# Patient Record
Sex: MALE | Race: WHITE | NOT HISPANIC OR LATINO | Employment: OTHER | URBAN - METROPOLITAN AREA
[De-identification: names, ages, dates, MRNs, and addresses within clinical notes are randomized per-mention and may not be internally consistent; named-entity substitution may affect disease eponyms.]

---

## 2017-08-07 ENCOUNTER — HOSPITAL ENCOUNTER (EMERGENCY)
Facility: HOSPITAL | Age: 82
Discharge: HOME | End: 2017-08-07
Attending: EMERGENCY MEDICINE | Admitting: EMERGENCY MEDICINE
Payer: MEDICARE

## 2017-08-07 ENCOUNTER — APPOINTMENT (EMERGENCY)
Dept: RADIOLOGY | Facility: HOSPITAL | Age: 82
End: 2017-08-07
Payer: MEDICARE

## 2017-08-07 VITALS
SYSTOLIC BLOOD PRESSURE: 147 MMHG | OXYGEN SATURATION: 98 % | RESPIRATION RATE: 17 BRPM | TEMPERATURE: 97.5 F | HEART RATE: 64 BPM | WEIGHT: 243.17 LBS | DIASTOLIC BLOOD PRESSURE: 78 MMHG

## 2017-08-07 DIAGNOSIS — M17.11 PRIMARY OSTEOARTHRITIS OF RIGHT KNEE: Primary | ICD-10-CM

## 2017-08-07 PROCEDURE — 73564 X-RAY EXAM KNEE 4 OR MORE: CPT

## 2017-08-07 PROCEDURE — 99283 EMERGENCY DEPT VISIT LOW MDM: CPT

## 2017-08-07 RX ORDER — PREDNISONE 20 MG/1
60 TABLET ORAL DAILY
Status: DISCONTINUED | OUTPATIENT
Start: 2017-08-07 | End: 2017-08-07 | Stop reason: HOSPADM

## 2017-08-07 RX ORDER — METHOCARBAMOL 500 MG/1
500 TABLET, FILM COATED ORAL ONCE
Status: COMPLETED | OUTPATIENT
Start: 2017-08-07 | End: 2017-08-07

## 2017-08-07 RX ORDER — PREDNISONE 20 MG/1
60 TABLET ORAL DAILY
Qty: 15 TABLET | Refills: 0 | Status: SHIPPED | OUTPATIENT
Start: 2017-08-07 | End: 2017-08-12

## 2017-08-07 RX ORDER — METHOCARBAMOL 500 MG/1
500 TABLET, FILM COATED ORAL 2 TIMES DAILY
Qty: 20 TABLET | Refills: 0 | Status: SHIPPED | OUTPATIENT
Start: 2017-08-07 | End: 2018-10-08

## 2017-08-07 RX ADMIN — PREDNISONE 60 MG: 20 TABLET ORAL at 12:34

## 2017-08-07 RX ADMIN — METHOCARBAMOL 500 MG: 500 TABLET ORAL at 12:34

## 2017-08-22 ENCOUNTER — ALLSCRIPTS OFFICE VISIT (OUTPATIENT)
Dept: OTHER | Facility: OTHER | Age: 82
End: 2017-08-22

## 2017-09-18 ENCOUNTER — GENERIC CONVERSION - ENCOUNTER (OUTPATIENT)
Dept: OTHER | Facility: OTHER | Age: 82
End: 2017-09-18

## 2018-01-13 VITALS — SYSTOLIC BLOOD PRESSURE: 115 MMHG | DIASTOLIC BLOOD PRESSURE: 76 MMHG | HEART RATE: 60 BPM

## 2018-10-08 ENCOUNTER — HOSPITAL ENCOUNTER (EMERGENCY)
Facility: HOSPITAL | Age: 83
Discharge: HOME | End: 2018-10-08
Attending: EMERGENCY MEDICINE | Admitting: EMERGENCY MEDICINE
Payer: MEDICARE

## 2018-10-08 VITALS
RESPIRATION RATE: 18 BRPM | HEART RATE: 60 BPM | HEIGHT: 67 IN | TEMPERATURE: 97.9 F | OXYGEN SATURATION: 99 % | DIASTOLIC BLOOD PRESSURE: 64 MMHG | WEIGHT: 230 LBS | SYSTOLIC BLOOD PRESSURE: 113 MMHG | BODY MASS INDEX: 36.1 KG/M2

## 2018-10-08 DIAGNOSIS — M25.561 RIGHT KNEE PAIN: Primary | ICD-10-CM

## 2018-10-08 PROCEDURE — 99283 EMERGENCY DEPT VISIT LOW MDM: CPT

## 2018-10-08 RX ORDER — METHOCARBAMOL 500 MG/1
500 TABLET, FILM COATED ORAL ONCE
Status: COMPLETED | OUTPATIENT
Start: 2018-10-08 | End: 2018-10-08

## 2018-10-08 RX ORDER — PREDNISONE 20 MG/1
40 TABLET ORAL DAILY
Qty: 10 TABLET | Refills: 0 | Status: SHIPPED | OUTPATIENT
Start: 2018-10-08 | End: 2018-10-13

## 2018-10-08 RX ORDER — BUMETANIDE 0.5 MG/1
0.5 TABLET ORAL DAILY
COMMUNITY
End: 2019-05-23 | Stop reason: SDUPTHER

## 2018-10-08 RX ORDER — PREDNISONE 20 MG/1
60 TABLET ORAL ONCE
Status: COMPLETED | OUTPATIENT
Start: 2018-10-08 | End: 2018-10-08

## 2018-10-08 RX ORDER — METHOCARBAMOL 500 MG/1
500 TABLET, FILM COATED ORAL 2 TIMES DAILY PRN
Qty: 14 TABLET | Refills: 0 | Status: SHIPPED | OUTPATIENT
Start: 2018-10-08 | End: 2018-10-11

## 2018-10-08 RX ADMIN — PREDNISONE 60 MG: 20 TABLET ORAL at 15:25

## 2018-10-08 RX ADMIN — METHOCARBAMOL 500 MG: 500 TABLET ORAL at 15:25

## 2018-10-08 NOTE — ED NOTES
Was at urgent care Saturday placed on meloxicam without relief        Juan Carlos Singh, RN  10/08/18 7127

## 2018-10-08 NOTE — DISCHARGE INSTRUCTIONS
Please return if you worsening or other concerning symptoms otherwise follow up as instructed as discussed    Knee Pain   WHAT YOU NEED TO KNOW:   Knee pain may start suddenly, or it may be a long-term problem  You may have pain on the side, front, or back of your knee  You may have knee stiffness and swelling  You may hear popping sounds or feel like your knee is giving way or locking up as you walk  You may feel pain when you sit, stand, walk, or climb up and down stairs  Knee pain can be caused by conditions such as obesity, inflammation, or strains or tears in ligaments or tendons  DISCHARGE INSTRUCTIONS:   Follow up with your healthcare provider within 24 hours or as directed: You may need follow-up treatments, such as steroid injections to decrease pain  Write down your questions so you remember to ask them during your visits  Self-care:   · Rest  your knee so it can heal  Limit activities that increase your pain  · Ice  can help reduce swelling  Wrap ice in a towel and put it on your knee for as long and as often as directed  · Compression  with a brace or bandage can help reduce swelling  Use a brace or bandage only as directed  · Elevation  helps decrease pain and swelling  Elevate your knee while you are sitting or lying down  Prop your leg on pillows to keep your knee above the level of your heart  Medicines:   · NSAIDs  help decrease swelling and pain or fever  This medicine is available with or without a doctor's order  NSAIDs can cause stomach bleeding or kidney problems in certain people  If you take blood thinner medicine, always ask your healthcare provider if NSAIDs are safe for you  Always read the medicine label and follow directions  · Acetaminophen  decreases pain and fever  It is available without a doctor's order  Ask how much to take and when to take it  Follow directions  Acetaminophen can cause liver damage if not taken correctly  · Take your medicine as directed  Contact your healthcare provider if you think your medicine is not helping or if you have side effects  Tell him or her if you are allergic to any medicine  Keep a list of the medicines, vitamins, and herbs you take  Include the amounts, and when and why you take them  Bring the list or the pill bottles to follow-up visits  Carry your medicine list with you in case of an emergency  Exercise as directed: You may need to see a physical therapist or do recommended exercises to improve movement and decrease your pain  You may be directed to walk, swim, or ride a bike  Follow your exercise plan exactly as directed to avoid further injury  Contact your healthcare provider if:   · You have questions or concerns about your condition or care  Return to the emergency department if:   · Your pain is worse, even after treatment  · You cannot bend or straighten your leg completely  · The swelling around your knee does not go down even with treatment  · Your knee is painful and hot to the touch  © 2017 [address] Information is for End User's use only and may not be sold, redistributed or otherwise used for commercial purposes  All illustrations and images included in CareNotes® are the copyrighted property of [employer] , [employer]  or [employer]  The above information is an  only  It is not intended as medical advice for individual conditions or treatments  Talk to your doctor, nurse or pharmacist before following any medical regimen to see if it is safe and effective for you

## 2018-10-08 NOTE — ED PROVIDER NOTES
History  Chief Complaint   Patient presents with    Knee Pain     pt c/o right knee pain, denies injuries  70-year-old male with a history of hypertension hypothyroid osteoarthritis presenting for evaluation of right knee pain he is here with his son he reports the last 2 days his right knee is been a little bit more stiff and sore than usual although he is able to ambulate he believes that there is mild swelling presents today for further evaluation he was evaluated in August with the exact same symptoms he had an x-ray that demonstrated severe tricompartmental arthritis he was given prednisone Robaxin and diclofenac gel which resolved the symptoms he followed up with Orthopedics he had been doing well until 2 days ago again no traumatic injury, or inciting incident pain is localized to his right anterior knee it is worse when walking although he is able to ambulate is better with rest he has not taking thing for this he note possible mild swelling but otherwise denies fevers chills constitutional symptoms change in activity denies warmth redness weakness unilateral leg swelling change in color changes skin temperature other joint involvement he has no other complaints or concerns otherwise denies a complete review systems as noted            Prior to Admission Medications   Prescriptions Last Dose Informant Patient Reported? Taking?    FOLIC ACID PO 36/2/6589 at Unknown time  Yes Yes   Sig: Take 1 capsule by mouth daily   bumetanide (BUMEX) 0 5 MG tablet   Yes Yes   Sig: Take 0 5 mg by mouth daily   carvedilol (COREG) 12 5 mg tablet 10/8/2018 at Unknown time  Yes Yes   Sig: Take 12 5 mg by mouth 3 (three) times a day     donepezil (ARICEPT) 10 mg tablet 10/8/2018 at Unknown time  Yes Yes   Sig: Take 10 mg by mouth daily at bedtime   levothyroxine 25 mcg tablet 10/8/2018 at Unknown time  Yes Yes   Sig: Take 25 mcg by mouth daily in the early morning   simvastatin (ZOCOR) 40 mg tablet 10/8/2018 at Unknown time  Yes Yes   Sig: Take 40 mg by mouth daily with dinner   spironolactone (ALDACTONE) 25 mg tablet 10/8/2018 at Unknown time  Yes Yes   Sig: Take 25 mg by mouth daily   warfarin (COUMADIN) 4 mg tablet 10/7/2018 at Unknown time  Yes Yes   Sig: Take 6 mg by mouth daily at bedtime      Facility-Administered Medications: None       Past Medical History:   Diagnosis Date    Arthritis     Cardiac disorder     Disease of thyroid gland     Hyperlipidemia     Hypertension     [facility]     [facility] sacrum [facility]     Stroke ([place] [address] )     Thyroid disorder        Past Surgical History:   Procedure Laterality Date    CARDIAC PACEMAKER PLACEMENT      COLOSTOMY      HIP SURGERY      LEG SURGERY      incision below knee       Family History   Problem Relation Age of Onset    Diabetes Mother     Hypertension Mother      I have reviewed and agree with the history as documented  Social History   Substance Use Topics    Smoking status: Never Smoker    Smokeless tobacco: Never Used      Comment: former smoker per [employer]     Alcohol use No        Review of Systems   Constitutional: Negative for activity change, appetite change, fatigue and fever  Gastrointestinal: Negative for nausea and vomiting  Endocrine: Negative for polydipsia and polyphagia  Musculoskeletal: Positive for joint swelling  Negative for arthralgias, gait problem and myalgias  Right knee pain   Skin: Negative for color change, pallor, rash and wound  Neurological: Negative for dizziness, weakness and numbness  Hematological: Negative for adenopathy  Does not bruise/bleed easily  Psychiatric/Behavioral: Negative for agitation and behavioral problems  All other systems reviewed and are negative  Physical Exam  Physical Exam   Constitutional: He is oriented to person, place, and time  He appears well-developed and well-nourished  No distress     Very well for stated age in no acute distress   HENT: Head: Normocephalic and atraumatic  Eyes: Pupils are equal, round, and reactive to light  EOM are normal    Neck: Normal range of motion  Neck supple  No tracheal deviation present  Cardiovascular: Normal rate, regular rhythm and normal heart sounds  Exam reveals no gallop and no friction rub  No murmur heard  Pulmonary/Chest: Effort normal and breath sounds normal  He has no wheezes  He has no rales  Abdominal: Soft  Bowel sounds are normal  He exhibits no distension  There is no tenderness  There is no rebound and no guarding  Musculoskeletal:   Minimal swelling of the right anterior knee when compared to the left however patient has full active and passive range of motion minimal discomfort, there is no warmth no erythema no injury to the skin he has a stable knee exam quadriceps is intact no unilateral leg swelling he has 2+ symmetric DP PT pulses again no other acute findings on musculoskeletal exam   Neurological: He is alert and oriented to person, place, and time  No cranial nerve deficit  He exhibits normal muscle tone  Coordination normal    Skin: Skin is warm and dry  No rash noted  Psychiatric: He has a normal mood and affect  His behavior is normal    Nursing note and vitals reviewed        Vital Signs  ED Triage Vitals [10/08/18 1414]   Temperature Pulse Respirations Blood Pressure SpO2   97 9 °F (36 6 °C) 60 18 113/64 99 %      Temp Source Heart Rate Source Patient Position - Orthostatic VS BP Location FiO2 (%)   Oral Monitor Sitting Left arm --      Pain Score       9           Vitals:    10/08/18 1414   BP: 113/64   Pulse: 60   Patient Position - Orthostatic VS: Sitting       Visual Acuity      ED Medications  Medications   predniSONE tablet 60 mg (60 mg Oral Given 10/8/18 1525)   methocarbamol (ROBAXIN) tablet 500 mg (500 mg Oral Given 10/8/18 1525)       Diagnostic Studies  Results Reviewed     None                 No orders to display              Procedures  Procedures Phone Contacts  ED Phone Contact    ED Course           Identification of Seniors at Risk      Most Recent Value   (ISAR) Identification of Seniors at Risk   Before the illness or injury that brought you to the Emergency, did you need someone to help you on a regular basis? 0 Filed at: 10/08/2018 1415   In the last 24 hours, have you needed more help than usual?  0 Filed at: 10/08/2018 1415   Have you been hospitalized for one or more nights during the past 6 months? 0 Filed at: 10/08/2018 1415   In general, do you see well?  0 Filed at: 10/08/2018 1415   In general, do you have serious problems with your memory? 0 Filed at: 10/08/2018 1415   Do you take more than three different medications every day?   1 Filed at: 10/08/2018 1415   ISAR Score  1 Filed at: 10/08/2018 1415                          [facility]  Number of Diagnoses or Management Options  Right knee pain:   Diagnosis management comments: 25-year-old male with a history of severe tricompartmental osteoarthritis his right knee with 2 days of atraumatic right knee discomfort localized to his right knee only possibly mild swelling no fevers chills warmth or other associated symptoms history of similar, on exam he is afebrile normal vital signs is clinically very well appearing he is here with his son he has full active and passive range of motion with minimal discomfort minimal effusion there is no warmth erythema or other acute ischemic infectious inflammatory traumatic findings on exam no indication for imaging at this time her discussion with patient family, previously successful with prednisone Robaxin diclofenac gel, will give Ace wraps ice pack close return follow-up instructions patient family agreeable plan     CritCare Time    Disposition  Final diagnoses:   Right knee pain     Time reflects when diagnosis was documented in both MDM as applicable and the Disposition within this note     Time User Action Codes Description Comment    10/8/2018  3:22 PM Chris Lozano Add [U49 [id number]] Right knee pain       ED Disposition     ED Disposition Condition Comment    Discharge  Rolan Hernandez discharge to home/self care      Condition at discharge: Good        Follow-up Information     Follow up With Specialties Details Why Contact Info Additional Information    [address] [facility] Emergency Medicine  If symptoms worsen [address]  [phone] [place] [facility], [address] [facility], [place], [place], [address] [facility] In 1 week As needed [address] [facility]  [address] [id number]  [address] [facility], [address] [address] [place], [place], [phone]          Discharge Medication List as of 10/8/2018  3:24 PM      START taking these medications    Details   diclofenac sodium (VOLTAREN) 1 % Apply 2 g topically 4 (four) times a day for 7 days, Starting Mon 10/8/2018, Until Mon 10/15/2018, Print      methocarbamol (ROBAXIN) 500 mg tablet Take 1 tablet (500 mg total) by mouth 2 (two) times a day as needed for muscle spasms for up to 7 days, Starting Mon 10/8/2018, Until Mon 10/15/2018, Print      predniSONE 20 mg tablet Take 2 tablets (40 mg total) by mouth daily for 5 days, Starting Mon 10/8/2018, Until Sat 10/13/2018, Print         CONTINUE these medications which have NOT CHANGED    Details   bumetanide (BUMEX) 0 5 MG tablet Take 0 5 mg by mouth daily, Historical Med      carvedilol (COREG) 12 5 mg tablet Take 12 5 mg by mouth 3 (three) times a day  , Starting Tue 4/26/2016, Historical Med      donepezil (ARICEPT) 10 mg tablet Take 10 mg by mouth daily at bedtime, Starting 1/16/2015, Until Discontinued, Historical Med      FOLIC ACID PO Take 1 capsule by mouth daily, Until Discontinued, Historical Med      levothyroxine 25 mcg tablet Take 25 mcg by mouth daily in the early morning, Starting 2/20/2015, Until Discontinued, Historical Med      simvastatin (ZOCOR) 40 mg tablet Take 40 mg by mouth daily with dinner, Starting 9/29/2016, Until Discontinued, Historical Med      spironolactone (ALDACTONE) 25 mg tablet Take 25 mg by mouth daily, Starting 9/29/2016, Until Discontinued, Historical Med      warfarin (COUMADIN) 4 mg tablet Take 6 mg by mouth daily at bedtime, Starting 9/29/2016, Until Discontinued, Historical Med           No discharge procedures on file      ED Provider  Electronically Signed by           Jessica Martinez DO  10/10/18 1133

## 2018-10-11 ENCOUNTER — HOSPITAL ENCOUNTER (EMERGENCY)
Facility: HOSPITAL | Age: 83
Discharge: HOME | End: 2018-10-11
Attending: EMERGENCY MEDICINE | Admitting: EMERGENCY MEDICINE
Payer: MEDICARE

## 2018-10-11 VITALS
HEART RATE: 61 BPM | DIASTOLIC BLOOD PRESSURE: 62 MMHG | SYSTOLIC BLOOD PRESSURE: 125 MMHG | OXYGEN SATURATION: 95 % | RESPIRATION RATE: 16 BRPM | TEMPERATURE: 97.6 F

## 2018-10-11 DIAGNOSIS — F19.951 STEROID-INDUCED PSYCHOSIS, WITH HALLUCINATIONS (HCC): Primary | ICD-10-CM

## 2018-10-11 PROCEDURE — 99284 EMERGENCY DEPT VISIT MOD MDM: CPT

## 2018-10-11 NOTE — DISCHARGE INSTRUCTIONS
You have steroid induced hallucinations  Please discontinue steroids  Please return in 2 days if no improvement of loose in a shins  If any worsening or new symptoms please return to the emergency department  Prednisone (By mouth)   Prednisone (PRED-ni-sone)  Treats many diseases and conditions, especially problems related to inflammation  This medicine is a corticosteroid  Brand Name(s): Contrast Allergy PreMed Pack, Leatha, predniSONE Intensol   There may be other brand names for this medicine  When This Medicine Should Not Be Used: This medicine is not right for everyone  Do not use if you had an allergic reaction to prednisone or if you are pregnant  How to Use This Medicine:   Liquid, Tablet, Delayed Release Tablet  · Take your medicine as directed  Your dose may need to be changed several times to find what works best for you  · It is best to take this medicine with food or milk  · Swallow the delayed-release tablet whole  Do not crush, break, or chew it  · Measure the oral liquid medicine with a marked measuring spoon, oral syringe, or medicine cup  · Missed dose: Take a dose as soon as you remember  If it is almost time for your next dose, wait until then and take a regular dose  Do not take extra medicine to make up for a missed dose  · Store the medicine in a closed container at room temperature, away from heat, moisture, and direct light  Do not freeze the oral liquid  Drugs and Foods to Avoid:   Ask your doctor or pharmacist before using any other medicine, including over-the-counter medicines, vitamins, and herbal products    · Tell your doctor if you use any of the following:  ¨ Aminoglutethimide, amphotericin B, carbamazepine, cholestyramine, cyclosporine, digoxin, isoniazid, ketoconazole, phenobarbital, phenytoin, or rifampin  ¨ Blood thinner, such as warfarin  ¨ NSAID pain or arthritis medicine, such as aspirin, diclofenac, ibuprofen, naproxen, celecoxib  ¨ Diuretic (water pill)  ¨ Diabetes medicine  ¨ Macrolide antibiotic, such as azithromycin, clarithromycin, erythromycin  ¨ Estrogen, including birth control pills or hormone replacement therapy  · This medicine may interfere with vaccines  Ask your doctor before you get a flu shot or any other vaccines  Warnings While Using This Medicine:   · It is not safe to take this medicine during pregnancy  It could harm an unborn baby  Tell your doctor right away if you become pregnant  · Tell your doctor if you are breastfeeding or if you have kidney problems, heart failure, high blood pressure, a recent heart attack, diabetes, glaucoma, osteoporosis, or thyroid problems  Tell your doctor about any infection you have  Also tell your doctor if you have had mental or emotional problems (such as depression) or stomach or bowel problems (such as an ulcer or diverticulitis)  · This medicine may cause the following problems:  ¨ Mood or behavior changes  ¨ Higher blood pressure, retaining water, changes in salt or potassium levels in your body  ¨ Cataracts or glaucoma (with long-term use)  ¨ Weak bones or osteoporosis (with long-term use)  ¨ Slow growth in children (with long-term use)  ¨ Muscle problems (with high doses, especially if you have myasthenia gravis or similar nerve and muscle problems)  · Do not stop using this medicine suddenly  Your doctor will need to slowly decrease your dose before you stop it completely  · This medicine could cause you to get infections more easily  Tell your doctor right away if you are exposed to chicken pox, measles, or other serious infection  Tell your doctor if you had a serious infection in the past, such as tuberculosis or herpes  · Tell your doctor about any extra stress or anxiety in your life  Your dose might need to be changed for a short time  · Tell any doctor or dentist who treats you that you are using this medicine  This medicine may affect certain medical test results    · Keep all medicine out of the reach of children  Never share your medicine with anyone  Possible Side Effects While Using This Medicine:   Call your doctor right away if you notice any of these side effects:  · Allergic reaction: Itching or hives, swelling in your face or hands, swelling or tingling in your mouth or throat, chest tightness, trouble breathing  · Dark freckles, skin color changes, coldness, weakness, tiredness, nausea, vomiting, weight loss  · Depression, unusual thoughts, feelings, or behaviors, trouble sleeping  · Fever, chills, cough, sore throat, and body aches  · Muscle pain or weakness  · Rapid weight gain, swelling in your hands, ankles, or feet  · Severe stomach pain, nausea, vomiting, or red or black stools  · Skin changes or growths  · Trouble seeing, eye pain, headache  If you notice these less serious side effects, talk with your doctor:   · Increased appetite  · Round, puffy face  · Weight gain around your neck, upper back, breast, face, or waist  If you notice other side effects that you think are caused by this medicine, tell your doctor  Call your doctor for medical advice about side effects  You may report side effects to FDA at 5-283-FDA-0113  © 2017 [facility] Information is for End User's use only and may not be sold, redistributed or otherwise used for commercial purposes  The above information is an  only  It is not intended as medical advice for individual conditions or treatments  Talk to your doctor, nurse or pharmacist before following any medical regimen to see if it is safe and effective for you

## 2018-10-12 NOTE — ED PROVIDER NOTES
History  Chief Complaint   Patient presents with    Hallucinations     pt recently started on prednisone and meloxicam started with hallucinations since yesterday     HPI  This is a 41-year-old male that presents today with hallucinations  Son at bedside states patient was recently here for ankle pain which she received meloxicam along with prednisone  Since starting those medications patient has been hallucinating where he believes there is and was outside along with there is pictures on the wall  He has also had been having frightening nightmares  This is day 2 and taking steroids  Patient has never taken steroids in the past   No other medication changes  No fevers or chills  Patient alert and oriented and has insight  Denies any suicidal homicidal ideations  Patient does understand he is having a loose in a shins  No chest pain headache blurry vision neck pain chest pain shortness of breath abdominal pain weakness numbness or tingling  No urinary complaints  Patient appears to be well appearing  41-year-old male that presents today with hallucinations  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 Taking?    FOLIC ACID PO   Yes Yes   Sig: Take 1 capsule by mouth daily   bumetanide (BUMEX) 0 5 MG tablet   Yes Yes   Sig: Take 0 5 mg by mouth daily   carvedilol (COREG) 12 5 mg tablet   Yes Yes   Sig: Take 12 5 mg by mouth 3 (three) times a day     diclofenac sodium (VOLTAREN) 1 %   No Yes   Sig: Apply 2 g topically 4 (four) times a day for 7 days   donepezil (ARICEPT) 10 mg tablet   Yes Yes   Sig: Take 10 mg by mouth daily at bedtime   levothyroxine 25 mcg tablet   Yes Yes   Sig: Take 25 mcg by mouth daily in the early morning   predniSONE 20 mg tablet   No Yes   Sig: Take 2 tablets (40 mg total) by mouth daily for 5 days   simvastatin (ZOCOR) 40 mg tablet   Yes Yes   Sig: Take 40 mg by mouth daily with dinner   spironolactone (ALDACTONE) 25 mg tablet   Yes Yes   Sig: Take 25 mg by mouth daily   warfarin (COUMADIN) 4 mg tablet   Yes Yes   Sig: Take 6 mg by mouth daily at bedtime      Facility-Administered Medications: None       Past Medical History:   Diagnosis Date    Arthritis     Cardiac disorder     Disease of thyroid gland     Hyperlipidemia     Hypertension     Osteomyelitis of leg (HCC)     Osteomyelitis of sacrum (HCC)     Stroke ([facility] )     Thyroid disorder        Past Surgical History:   Procedure Laterality Date    CARDIAC PACEMAKER PLACEMENT      COLOSTOMY      HIP SURGERY      LEG SURGERY      incision below knee       Family History   Problem Relation Age of Onset    Diabetes Mother     Hypertension Mother      I have reviewed and agree with the history as documented  Social History   Substance Use Topics    Smoking status: Never Smoker    Smokeless tobacco: Never Used      Comment: former smoker per allscript     Alcohol use No        Review of Systems   Constitutional: Negative  Negative for diaphoresis and fever  HENT: Negative  Respiratory: Negative  Negative for cough, shortness of breath and wheezing  Cardiovascular: Negative  Negative for chest pain, palpitations and leg swelling  Gastrointestinal: Negative for abdominal distention, abdominal pain, nausea and vomiting  Genitourinary: Negative  Musculoskeletal: Negative  Skin: Negative  Neurological: Negative  Psychiatric/Behavioral: Positive for hallucinations  All other systems reviewed and are negative  Physical Exam  Physical Exam   Constitutional: He is oriented to person, place, and time  He appears well-developed and well-nourished  No distress  HENT:   Head: Normocephalic and atraumatic  Nose: Nose normal    Mouth/Throat: Oropharynx is clear and moist    Eyes: Pupils are equal, round, and reactive to light  Conjunctivae and EOM are normal    Neck: Normal range of motion  Neck supple  Cardiovascular: Normal rate, regular rhythm and normal heart sounds  No murmur heard  Pulmonary/Chest: Effort normal and breath sounds normal  No respiratory distress  He has no wheezes  He has no rales  Abdominal: Soft  Bowel sounds are normal  He exhibits no distension  There is no tenderness  There is no rebound and no guarding  Musculoskeletal: Normal range of motion  He exhibits no edema, tenderness or deformity  Neurological: He is alert and oriented to person, place, and time  No cranial nerve deficit  Normal neuro exam   Skin: Skin is warm and dry  No rash noted  He is not diaphoretic  No pallor  Psychiatric: He has a normal mood and affect  Vitals reviewed  Vital Signs  ED Triage Vitals [10/11/18 1420]   Temperature Pulse Respirations Blood Pressure SpO2   97 6 °F (36 4 °C) 61 16 125/62 95 %      Temp Source Heart Rate Source Patient Position - Orthostatic VS BP Location FiO2 (%)   Oral Monitor Sitting Left arm --      Pain Score       No Pain           Vitals:    10/11/18 1420   BP: 125/62   Pulse: 61   Patient Position - Orthostatic VS: Sitting       Visual Acuity      ED Medications  Medications - No data to display    Diagnostic Studies  Results Reviewed     None                 No orders to display              Procedures  Procedures       Phone Contacts  ED Phone Contact    ED Course           Identification of Seniors at Risk      Most Recent Value   (ISAR) Identification of Seniors at Risk   Before the illness or injury that brought you to the Emergency, did you need someone to help you on a regular basis?   1 Filed at: 10/11/2018 1422   In the last 24 hours, have you needed more help than usual?  1 Filed at: 10/11/2018 1422   Have you been hospitalized for one or more nights during the past 6 months? 0 Filed at: 10/11/2018 1422   In general, do you see well?  0 Filed at: 10/11/2018 1422   In general, do you have serious problems with your memory? 0 Filed at: 10/11/2018 1422   Do you take more than three different medications every day? 1 Filed at: 10/11/2018 1422   ISAR Score  3 Filed at: 10/11/2018 1422                          [facility]  CritCare Time    Disposition  Final diagnoses:   Steroid-induced psychosis, with hallucinations ([address] )     Time reflects when diagnosis was documented in both MDM as applicable and the Disposition within this note     Time User Action Codes Description Comment    10/11/2018  3:03 PM Iván Mittal U  8  [E80 921] Steroid-induced psychosis, with hallucinations [facility])       ED Disposition     ED Disposition Condition Comment    Discharge  [place] discharge to home/self care      Condition at discharge: Good        Follow-up Information     Follow up With Specialties Details Why Contact Info    Krysta Strange MD Family Medicine Schedule an appointment as soon as possible for a visit  [address]  [phone]            Discharge Medication List as of 10/11/2018  3:04 PM      CONTINUE these medications which have NOT CHANGED    Details   bumetanide (BUMEX) 0 5 MG tablet Take 0 5 mg by mouth daily, Historical Med      carvedilol (COREG) 12 5 mg tablet Take 12 5 mg by mouth 3 (three) times a day  , Starting Tue 4/26/2016, Historical Med      diclofenac sodium (VOLTAREN) 1 % Apply 2 g topically 4 (four) times a day for 7 days, Starting Mon 10/8/2018, Until Mon 10/15/2018, Print      donepezil (ARICEPT) 10 mg tablet Take 10 mg by mouth daily at bedtime, Starting 1/16/2015, Until Discontinued, Historical Med      FOLIC ACID PO Take 1 capsule by mouth daily, Until Discontinued, Historical Med      levothyroxine 25 mcg tablet Take 25 mcg by mouth daily in the early morning, Starting 2/20/2015, Until Discontinued, Historical Med predniSONE 20 mg tablet Take 2 tablets (40 mg total) by mouth daily for 5 days, Starting Mon 10/8/2018, Until Sat 10/13/2018, Print      simvastatin (ZOCOR) 40 mg tablet Take 40 mg by mouth daily with dinner, Starting 9/29/2016, Until Discontinued, Historical Med      spironolactone (ALDACTONE) 25 mg tablet Take 25 mg by mouth daily, Starting 9/29/2016, Until Discontinued, Historical Med      warfarin (COUMADIN) 4 mg tablet Take 6 mg by mouth daily at bedtime, Starting 9/29/2016, Until Discontinued, Historical Med      methocarbamol (ROBAXIN) 500 mg tablet Take 1 tablet (500 mg total) by mouth 2 (two) times a day as needed for muscle spasms for up to 7 days, Starting Mon 10/8/2018, Until Mon 10/15/2018, Print           No discharge procedures on file      ED Provider  Electronically Signed by           Rudy Kinsey MD  10/11/18 5963

## 2019-03-26 ENCOUNTER — APPOINTMENT (OUTPATIENT)
Dept: ULTRASOUND IMAGING | Facility: HOSPITAL | Age: 84
End: 2019-03-26
Payer: MEDICARE

## 2019-03-26 ENCOUNTER — HOSPITAL ENCOUNTER (OUTPATIENT)
Facility: HOSPITAL | Age: 84
Setting detail: OBSERVATION
Discharge: HOME HEALTH | End: 2019-03-27
Attending: EMERGENCY MEDICINE | Admitting: INTERNAL MEDICINE
Payer: MEDICARE

## 2019-03-26 ENCOUNTER — APPOINTMENT (EMERGENCY)
Dept: RADIOLOGY | Facility: HOSPITAL | Age: 84
End: 2019-03-26
Payer: MEDICARE

## 2019-03-26 ENCOUNTER — APPOINTMENT (EMERGENCY)
Dept: CT IMAGING | Facility: HOSPITAL | Age: 84
End: 2019-03-26
Payer: MEDICARE

## 2019-03-26 DIAGNOSIS — R41.0 CONFUSION: ICD-10-CM

## 2019-03-26 DIAGNOSIS — R47.81 SLURRED SPEECH: Primary | ICD-10-CM

## 2019-03-26 DIAGNOSIS — G93.40 ACUTE ENCEPHALOPATHY: ICD-10-CM

## 2019-03-26 DIAGNOSIS — Z86.73 HISTORY OF CVA (CEREBROVASCULAR ACCIDENT): ICD-10-CM

## 2019-03-26 PROBLEM — Z92.29 HISTORY OF COUMADIN THERAPY: Chronic | Status: ACTIVE | Noted: 2019-03-26
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
## 2019-03-26 PROBLEM — I10 ESSENTIAL HYPERTENSION: Chronic | Status: ACTIVE | Noted: 2019-03-26

## 2019-03-26 PROBLEM — E03.9 ACQUIRED HYPOTHYROIDISM: Chronic | Status: ACTIVE | Noted: 2019-03-26

## 2019-03-26 PROBLEM — R79.89 ELEVATED SERUM CREATININE: Status: ACTIVE | Noted: 2019-03-26

## 2019-03-26 LAB
ALBUMIN SERPL BCP-MCNC: 2.9 G/DL (ref 3.5–5)
ALP SERPL-CCNC: 76 U/L (ref 46–116)
ALT SERPL W P-5'-P-CCNC: 18 U/L (ref 12–78)
ANION GAP SERPL CALCULATED.3IONS-SCNC: 5 MMOL/L (ref 4–13)
APTT PPP: 50 SECONDS (ref 26–38)
AST SERPL W P-5'-P-CCNC: 14 U/L (ref 5–45)
BASOPHILS # BLD AUTO: 0.03 THOUSANDS/ΜL (ref 0–0.1)
BASOPHILS NFR BLD AUTO: 0 % (ref 0–1)
BILIRUB SERPL-MCNC: 0.5 MG/DL (ref 0.2–1)
BILIRUB UR QL STRIP: NEGATIVE
BUN SERPL-MCNC: 24 MG/DL (ref 5–25)
CALCIUM SERPL-MCNC: 8.6 MG/DL (ref 8.3–10.1)
CHLORIDE SERPL-SCNC: 106 MMOL/L (ref 100–108)
CLARITY UR: CLEAR
CO2 SERPL-SCNC: 30 MMOL/L (ref 21–32)
COLOR UR: YELLOW
CREAT SERPL-MCNC: 1.33 MG/DL (ref 0.6–1.3)
EOSINOPHIL # BLD AUTO: 0.12 THOUSAND/ΜL (ref 0–0.61)
EOSINOPHIL NFR BLD AUTO: 2 % (ref 0–6)
ERYTHROCYTE [DISTWIDTH] IN BLOOD BY AUTOMATED COUNT: 13.2 % (ref 11.6–15.1)
GFR SERPL CREATININE-BSD FRML MDRD: 47 ML/MIN/1.73SQ M
GLUCOSE SERPL-MCNC: 96 MG/DL (ref 65–140)
GLUCOSE UR STRIP-MCNC: NEGATIVE MG/DL
HCT VFR BLD AUTO: 37 % (ref 36.5–49.3)
HGB BLD-MCNC: 11.9 G/DL (ref 12–17)
HGB UR QL STRIP.AUTO: NEGATIVE
IMM GRANULOCYTES # BLD AUTO: 0.03 THOUSAND/UL (ref 0–0.2)
IMM GRANULOCYTES NFR BLD AUTO: 0 % (ref 0–2)
INR PPP: 3.29 (ref 0.86–1.17)
KETONES UR STRIP-MCNC: ABNORMAL MG/DL
LEUKOCYTE ESTERASE UR QL STRIP: NEGATIVE
LYMPHOCYTES # BLD AUTO: 1.34 THOUSANDS/ΜL (ref 0.6–4.47)
LYMPHOCYTES NFR BLD AUTO: 17 % (ref 14–44)
MCH RBC QN AUTO: 30.7 PG (ref 26.8–34.3)
MCHC RBC AUTO-ENTMCNC: 32.2 G/DL (ref 31.4–37.4)
MCV RBC AUTO: 96 FL (ref 82–98)
MONOCYTES # BLD AUTO: 0.64 THOUSAND/ΜL (ref 0.17–1.22)
MONOCYTES NFR BLD AUTO: 8 % (ref 4–12)
NEUTROPHILS # BLD AUTO: 5.97 THOUSANDS/ΜL (ref 1.85–7.62)
NEUTS SEG NFR BLD AUTO: 73 % (ref 43–75)
NITRITE UR QL STRIP: NEGATIVE
NRBC BLD AUTO-RTO: 0 /100 WBCS
PH UR STRIP.AUTO: 5.5 [PH]
PLATELET # BLD AUTO: 244 THOUSANDS/UL (ref 149–390)
PMV BLD AUTO: 10.1 FL (ref 8.9–12.7)
POTASSIUM SERPL-SCNC: 4.8 MMOL/L (ref 3.5–5.3)
PROT SERPL-MCNC: 6.4 G/DL (ref 6.4–8.2)
PROT UR STRIP-MCNC: NEGATIVE MG/DL
PROTHROMBIN TIME: 33 SECONDS (ref 11.8–14.2)
RBC # BLD AUTO: 3.87 MILLION/UL (ref 3.88–5.62)
SODIUM SERPL-SCNC: 141 MMOL/L (ref 136–145)
SP GR UR STRIP.AUTO: >=1.03 (ref 1–1.03)
TROPONIN I SERPL-MCNC: <0.02 NG/ML
TSH SERPL DL<=0.05 MIU/L-ACNC: 0.31 UIU/ML (ref 0.36–3.74)
UROBILINOGEN UR QL STRIP.AUTO: 0.2 E.U./DL
WBC # BLD AUTO: 8.13 THOUSAND/UL (ref 4.31–10.16)

## 2019-03-26 PROCEDURE — 99285 EMERGENCY DEPT VISIT HI MDM: CPT

## 2019-03-26 PROCEDURE — 80053 COMPREHEN METABOLIC PANEL: CPT | Performed by: EMERGENCY MEDICINE

## 2019-03-26 PROCEDURE — 70450 CT HEAD/BRAIN W/O DYE: CPT

## 2019-03-26 PROCEDURE — 93005 ELECTROCARDIOGRAM TRACING: CPT

## 2019-03-26 PROCEDURE — 36415 COLL VENOUS BLD VENIPUNCTURE: CPT | Performed by: EMERGENCY MEDICINE

## 2019-03-26 PROCEDURE — 71046 X-RAY EXAM CHEST 2 VIEWS: CPT

## 2019-03-26 PROCEDURE — 93880 EXTRACRANIAL BILAT STUDY: CPT | Performed by: SURGERY

## 2019-03-26 PROCEDURE — 1123F ACP DISCUSS/DSCN MKR DOCD: CPT | Performed by: NURSE PRACTITIONER

## 2019-03-26 PROCEDURE — 93880 EXTRACRANIAL BILAT STUDY: CPT

## 2019-03-26 PROCEDURE — 85025 COMPLETE CBC W/AUTO DIFF WBC: CPT | Performed by: EMERGENCY MEDICINE

## 2019-03-26 PROCEDURE — 99220 PR INITIAL OBSERVATION CARE/DAY 70 MINUTES: CPT | Performed by: INTERNAL MEDICINE

## 2019-03-26 PROCEDURE — 84484 ASSAY OF TROPONIN QUANT: CPT | Performed by: EMERGENCY MEDICINE

## 2019-03-26 PROCEDURE — 85610 PROTHROMBIN TIME: CPT | Performed by: EMERGENCY MEDICINE

## 2019-03-26 PROCEDURE — 76770 US EXAM ABDO BACK WALL COMP: CPT

## 2019-03-26 PROCEDURE — 81003 URINALYSIS AUTO W/O SCOPE: CPT | Performed by: EMERGENCY MEDICINE

## 2019-03-26 PROCEDURE — 85730 THROMBOPLASTIN TIME PARTIAL: CPT | Performed by: EMERGENCY MEDICINE

## 2019-03-26 PROCEDURE — 84443 ASSAY THYROID STIM HORMONE: CPT | Performed by: INTERNAL MEDICINE

## 2019-03-26 RX ORDER — ASPIRIN 81 MG/1
81 TABLET, CHEWABLE ORAL DAILY
Status: DISCONTINUED | OUTPATIENT
Start: 2019-03-26 | End: 2019-03-27

## 2019-03-26 RX ORDER — DONEPEZIL HYDROCHLORIDE 5 MG/1
10 TABLET, FILM COATED ORAL
Status: DISCONTINUED | OUTPATIENT
Start: 2019-03-26 | End: 2019-03-27 | Stop reason: HOSPADM

## 2019-03-26 RX ORDER — WARFARIN SODIUM 4 MG/1
4 TABLET ORAL
Status: DISCONTINUED | OUTPATIENT
Start: 2019-03-27 | End: 2019-03-27 | Stop reason: HOSPADM

## 2019-03-26 RX ORDER — SODIUM CHLORIDE 9 MG/ML
75 INJECTION, SOLUTION INTRAVENOUS ONCE
Status: COMPLETED | OUTPATIENT
Start: 2019-03-26 | End: 2019-03-26

## 2019-03-26 RX ORDER — CARVEDILOL 12.5 MG/1
12.5 TABLET ORAL 2 TIMES DAILY WITH MEALS
Status: DISCONTINUED | OUTPATIENT
Start: 2019-03-26 | End: 2019-03-27 | Stop reason: HOSPADM

## 2019-03-26 RX ORDER — SPIRONOLACTONE 25 MG/1
12.5 TABLET ORAL DAILY
Status: DISCONTINUED | OUTPATIENT
Start: 2019-03-26 | End: 2019-03-26

## 2019-03-26 RX ORDER — LEVOTHYROXINE SODIUM 0.03 MG/1
25 TABLET ORAL
Status: DISCONTINUED | OUTPATIENT
Start: 2019-03-27 | End: 2019-03-27 | Stop reason: HOSPADM

## 2019-03-26 RX ORDER — PRAVASTATIN SODIUM 80 MG/1
80 TABLET ORAL
Status: DISCONTINUED | OUTPATIENT
Start: 2019-03-26 | End: 2019-03-27 | Stop reason: HOSPADM

## 2019-03-26 RX ADMIN — ASPIRIN 81 MG 81 MG: 81 TABLET ORAL at 15:43

## 2019-03-26 RX ADMIN — CARVEDILOL 12.5 MG: 12.5 TABLET, FILM COATED ORAL at 16:44

## 2019-03-26 RX ADMIN — SODIUM CHLORIDE 75 ML/HR: 0.9 INJECTION, SOLUTION INTRAVENOUS at 15:49

## 2019-03-26 RX ADMIN — PRAVASTATIN SODIUM 80 MG: 80 TABLET ORAL at 16:46

## 2019-03-26 RX ADMIN — DONEPEZIL HYDROCHLORIDE 10 MG: 5 TABLET ORAL at 23:38

## 2019-03-26 NOTE — ED PROVIDER NOTES
History  Chief Complaint   Patient presents with    Altered Mental Status     pt fell x2 weeks ago- hit head and back, pt takes coumadin  pt was d/c home from [facility] with no injuries  pt awoke this morning at 1030 with confusion, mumbling words, disorientation  symptoms resolved around noon  came to ED for eval d/t previous fall  pt disoriented in triage  HPI  79 yo M presents with confusion  He is taking eliquis for PAF  Patient fell on 3/13/19, was evaluated at [facility], had negative CT scans  Patient's INR supratherapeutic at 4 8 on 3/20, 3 1 on 3/25  According to family patient woke up this morning at 10:30 a m  With confusion and slurred speech that lasted a few minutes  Has history of stroke years ago  Patient states he still feels slightly off but is now alert and oriented  No fevers or chills  No abdominal pain, chest pain, shortness of breath, dysuria or frequency  No weakness or numbness  Prior to Admission Medications   Prescriptions Last Dose Informant Patient Reported? Taking?    FOLIC ACID PO 8/40/0932 at Unknown time Care Giver Yes Yes   Sig: Take 1 capsule by mouth daily   bumetanide (BUMEX) 0 5 MG tablet 3/25/2019 at Unknown time Care Giver Yes Yes   Sig: Take 0 5 mg by mouth daily   carvedilol (COREG) 12 5 mg tablet 3/26/2019 at Unknown time Care Giver Yes Yes   Sig: Take 12 5 mg by mouth 3 (three) times a day     diclofenac sodium (VOLTAREN) 1 %  Care Giver No No   Sig: Apply 2 g topically 4 (four) times a day for 7 days   donepezil (ARICEPT) 10 mg tablet 3/25/2019 at Unknown time Care Giver Yes Yes   Sig: Take 10 mg by mouth daily at bedtime   levothyroxine 25 mcg tablet 3/26/2019 at Unknown time Care Giver Yes Yes   Sig: Take 25 mcg by mouth daily in the early morning   simvastatin (ZOCOR) 40 mg tablet 3/25/2019 at Unknown time Care Giver Yes Yes   Sig: Take 40 mg by mouth daily at bedtime    spironolactone (ALDACTONE) 25 mg tablet 3/26/2019 at Unknown time Care Giver Yes Yes   Sig: Take 12 5 mg by mouth daily    warfarin (COUMADIN) 4 mg tablet 3/25/2019 at Unknown time Care Giver Yes Yes   Sig: Take 4 mg by mouth daily at bedtime       Facility-Administered Medications: None       Past Medical History:   Diagnosis Date    Arthritis     Cardiac disorder     Disease of thyroid gland     Hyperlipidemia     Hypertension     [facility] )     [facility]omyelitis of sacrum ([facility] )     Stroke ([place] [address] )     Thyroid disorder        Past Surgical History:   Procedure Laterality Date    CARDIAC PACEMAKER PLACEMENT      COLOSTOMY      HIP SURGERY      LEG SURGERY      incision below knee       Family History   Problem Relation Age of Onset    Diabetes Mother     Hypertension Mother      I have reviewed and agree with the history as documented  Social History     Tobacco Use    Smoking status: Never Smoker    Smokeless tobacco: Never Used    Tobacco comment: former smoker per allscript    Substance Use Topics    Alcohol use: No    Drug use: No        Review of Systems   Constitutional: Negative for chills and fever  HENT: Negative for dental problem and ear pain  Eyes: Negative for pain and redness  Respiratory: Negative for cough and shortness of breath  Cardiovascular: Negative for chest pain and palpitations  Gastrointestinal: Negative for abdominal pain and nausea  Endocrine: Negative for polydipsia and polyphagia  Genitourinary: Negative for dysuria and frequency  Musculoskeletal: Negative for arthralgias and joint swelling  Skin: Negative for color change and rash  Neurological: Positive for speech difficulty  Negative for dizziness and headaches  Psychiatric/Behavioral: Positive for confusion  Negative for behavioral problems  All other systems reviewed and are negative  Physical Exam  Physical Exam   Constitutional: He is oriented to person, place, and time  He appears well-developed and well-nourished  No distress  HENT:   Head: Atraumatic  Right Ear: External ear normal    Left Ear: External ear normal    Nose: Nose normal    Eyes: Pupils are equal, round, and reactive to light  Conjunctivae and EOM are normal    Neck: Normal range of motion  Neck supple  No JVD present  Cardiovascular: Normal rate, regular rhythm and normal heart sounds  No murmur heard  Pulmonary/Chest: Effort normal and breath sounds normal  No respiratory distress  He has no wheezes  Abdominal: Soft  Bowel sounds are normal  He exhibits no distension  There is no tenderness  Musculoskeletal: Normal range of motion  He exhibits no edema  Neurological: He is alert and oriented to person, place, and time  No cranial nerve deficit  Skin: Skin is warm and dry  Capillary refill takes less than 2 seconds  He is not diaphoretic  Psychiatric: He has a normal mood and affect  His behavior is normal    Nursing note and vitals reviewed        Vital Signs  ED Triage Vitals [03/26/19 1231]   Temperature Pulse Respirations Blood Pressure SpO2   98 °F (36 7 °C) 60 16 118/67 96 %      Temp Source Heart Rate Source Patient Position - Orthostatic VS BP Location FiO2 (%)   Oral Monitor Sitting Left arm --      Pain Score       No Pain           Vitals:    03/26/19 1300 03/26/19 1345 03/26/19 1400 03/26/19 1415   BP: 119/62 107/56     Pulse: 61 61 60 59   Patient Position - Orthostatic VS:             Visual Acuity  Visual Acuity      Most Recent Value   L Pupil Size (mm)  3   R Pupil Size (mm)  3          ED Medications  Medications - No data to display    Diagnostic Studies  Results Reviewed     Procedure Component Value Units Date/Time    Comprehensive metabolic panel [[id number]]  (Abnormal) Collected:  03/26/19 1310    Lab Status:  Final result Specimen:  Blood from Arm, Right Updated:  03/26/19 1344     Sodium 141 mmol/L      Potassium 4 8 mmol/L      Chloride 106 mmol/L      CO2 30 mmol/L      ANION GAP 5 mmol/L      BUN 24 mg/dL      Creatinine 1 33 mg/dL      Glucose 96 mg/dL Calcium 8 6 mg/dL      AST 14 U/L      ALT 18 U/L      Alkaline Phosphatase 76 U/L      Total Protein 6 4 g/dL      Albumin 2 9 g/dL      Total Bilirubin 0 50 mg/dL      eGFR 47 ml/min/1 73sq m     Narrative:       National Kidney Disease Education Program recommendations are as follows:  GFR calculation is accurate only with a steady state creatinine  Chronic Kidney disease less than 60 ml/min/1 73 sq  meters  Kidney failure less than 15 ml/min/1 73 sq  meters      Troponin I [[id number]]  (Normal) Collected:  03/26/19 1310    Lab Status:  Final result Specimen:  Blood from Arm, Right Updated:  03/26/19 1341     Troponin I <0 02 ng/mL     Protime-INR [[id number]]  (Abnormal) Collected:  03/26/19 1310    Lab Status:  Final result Specimen:  Blood from Arm, Right Updated:  03/26/19 1332     Protime 33 0 seconds      INR 3 29    APTT [[id number]]  (Abnormal) Collected:  03/26/19 1310    Lab Status:  Final result Specimen:  Blood from Arm, Right Updated:  03/26/19 1332     PTT 50 seconds     CBC and differential [[id number]]  (Abnormal) Collected:  03/26/19 1310    Lab Status:  Final result Specimen:  Blood from Arm, Right Updated:  03/26/19 1318     WBC 8 13 Thousand/uL      RBC 3 87 Million/uL      Hemoglobin 11 9 g/dL      Hematocrit 37 0 %      MCV 96 fL      MCH 30 7 pg      MCHC 32 2 g/dL      RDW 13 2 %      MPV 10 1 fL      Platelets 754 Thousands/uL      nRBC 0 /100 WBCs      Neutrophils Relative 73 %      Immat GRANS % 0 %      Lymphocytes Relative 17 %      Monocytes Relative 8 %      Eosinophils Relative 2 %      Basophils Relative 0 %      Neutrophils Absolute 5 97 Thousands/µL      Immature Grans Absolute 0 03 Thousand/uL      Lymphocytes Absolute 1 34 Thousands/µL      Monocytes Absolute 0 64 Thousand/µL      Eosinophils Absolute 0 12 Thousand/µL      Basophils Absolute 0 03 Thousands/µL     UA (URINE) with reflex to Microscopic [[id number]]     Lab Status:  No result Specimen:  Urine                  CT head wo contrast   Final Result by Destini Reddy MD (03/26 1311)      No acute intracranial hemorrhage seen   No mass effect or midline shift seen                  Workstation performed: [id number]         XR chest 2 views    (Results Pending)              Procedures  ECG 12 Lead Documentation  Date/Time: 3/26/2019 1:08 PM  Performed by: Rick Davidson MD  Authorized by: Rick Davidson MD     Comments:      Sinus rhythm rate of 60, QRS wide at 134, No acute ST elevations or depressions           Phone Contacts  ED Phone Contact    ED Course           Identification of Seniors at Risk      Most Recent Value   (ISAR) Identification of Seniors at Risk   Before the illness or injury that brought you to the Emergency, did you need someone to help you on a regular basis? 1 Filed at: 03/26/2019 1235   In the last 24 hours, have you needed more help than usual?  1 Filed at: 03/26/2019 1235   Have you been hospitalized for one or more nights during the past 6 months? 0 Filed at: 03/26/2019 1235   In general, do you see well?  0 Filed at: 03/26/2019 1235   In general, do you have serious problems with your memory? 1 Filed at: 03/26/2019 1235   Do you take more than three different medications every day? 1 Filed at: 03/26/2019 1235   ISAR Score  4 Filed at: 03/26/2019 1235                          [facility]  Number of Diagnoses or Management Options  Confusion:   History of CVA (cerebrovascular accident):   Slurred speech:   Diagnosis management comments: 79 yo M presents with episode of slurred speech and confusion today  Recent fall, workup [facility] negative  CT head today shows no acute process  Labs unremarkable except for creatinine 1 33, INR 3 29  Symptoms resolved at this time   Will admit for TIA/CVA pathway      Disposition  Final diagnoses:   Slurred speech   Confusion   History of CVA (cerebrovascular accident)     Time reflects when diagnosis was documented in both MDM as applicable and the Disposition within this note     Time User Action Codes Description Comment    3/26/2019  2:04 PM Floria Rued Add [R47 81] Slurred speech     3/26/2019  2:05 PM Floria Rued Add [R41 0] Confusion     3/26/2019  2:05 PM Floria Rued Add [Z86 73] History of CVA (cerebrovascular accident)       ED Disposition     ED Disposition Condition Date/Time Comment    Admit Stable Tue Mar 26, 2019  2:03 PM Case was discussed with Dr Rashaun Martin and the patient's admission status was agreed to be Admission Status: observation status to the service of Dr Rashaun Martin   Follow-up Information    None         Patient's Medications   Discharge Prescriptions    No medications on file     No discharge procedures on file      ED Provider  Electronically Signed by           Karina Woods MD  03/26/19 0489

## 2019-03-26 NOTE — ASSESSMENT & PLAN NOTE
Patient had transiently altered mental status this a m  At approximately 10:30, also associated with garbled speech, symptoms resolved after approximately 30 minutes  Stroke order set initiated, no MRI due to pacemaker, patient has had complete resolution of symptoms, will monitor on telemetry, give aspirin 81 mg x1 now, not likely require aspirin at discharge disease chronically on Coumadin  Neuro checks per protocol  Check hemoglobin A1c, check fasting lipid profile  Patient sustained a fall about 2 weeks ago with closed traumatic head injury

## 2019-03-26 NOTE — ED NOTES
1  CC: Altered Mental Status  2  OS: Alert and oriented  3  Abnormal labs/vitals, focused assessment  4  Medications/drips: Aspirin, 1 liter of Normal Saline  5  Narcotic time/pain: denies pain   6  IV lines/drains/etc: 20 gauge on right AC  7  Isolation status: standard  8  Skin: good  9  Ambulation status: Assist x 1, uses bedside commode   10   Phone number: [phone]     Darshana Perla RN  03/26/19 0139

## 2019-03-26 NOTE — ASSESSMENT & PLAN NOTE
Unknown baseline creatinine, check renal ultrasound    Normal saline at 75 mL/hour x1 L ordered, hold Bumex and Aldactone, follow up a m   Labs

## 2019-03-26 NOTE — ED NOTES
Ultrasound at bedside     Mauro Katz, [address] [facility]  03/26/19 1690

## 2019-03-26 NOTE — ED NOTES
Daughter and granddaughter reported pt acted very out of character this morning  Episode of confusion  Granddaughter reports last time normal was 0900  However, pt not confused at present, appropriate and GCS all wnl       Ele Tan RN  03/26/19 1257

## 2019-03-26 NOTE — ED NOTES
Patient alert and awake, watching television, daughter at bedside     Shreyas Rene RN  03/26/19 0153

## 2019-03-26 NOTE — ASSESSMENT & PLAN NOTE
In sustained mechanical fall approximately 2 weeks ago, was evaluated at [facility], had trauma evaluation and was discharged home from the emergency room

## 2019-03-26 NOTE — H&P
H&P- Kulwant Pritchett 6/21/1929, 80 y o  male MRN: [id number]    Unit/Bed#: ED 17 Encounter: [id number]    Primary Care Provider: Sandro Macias MD   Date and time admitted to hospital: 3/26/2019 12:36 PM        * Acute encephalopathy  Assessment & Plan  Patient had transiently altered mental status this a m  At approximately 10:30, also associated with garbled speech, symptoms resolved after approximately 30 minutes  Stroke order set initiated, no MRI due to pacemaker, patient has had complete resolution of symptoms, will monitor on telemetry, give aspirin 81 mg x1 now, not likely require aspirin at discharge disease chronically on Coumadin  Neuro checks per protocol  Check hemoglobin A1c, check fasting lipid profile  Patient sustained a fall about 2 weeks ago with closed traumatic head injury  Elevated serum creatinine  Assessment & Plan  Unknown baseline creatinine, check renal ultrasound    Normal saline at 75 mL/hour x1 L ordered, hold Bumex and Aldactone, follow up a m  Labs    Fall  Assessment & Plan  In sustained mechanical fall approximately 2 weeks ago, was evaluated at [facility], had trauma evaluation and was discharged home from the emergency room  Acquired hypothyroidism  Assessment & Plan  Check TSH    History of Coumadin therapy  Assessment & Plan  INR is slightly supratherapeutic, hold tonight's dose and resume tomorrow    Essential hypertension  Assessment & Plan  Continue with Coreg, monitor blood pressure  Holding diuretics  VTE Prophylaxis: Warfarin (Coumadin)  / sequential compression device   Code Status:  Full code  POLST: There is no POLST form on file for this patient (pre-hospital)    Anticipated Length of Stay:  Patient will be admitted on an Observation basis with an anticipated length of stay of  less than 2 midnights     Justification for Hospital Stay:  Patient had transiently altered mental status, slurred speech, needs further diagnostic workup, monitoring, PT/OT eval tomorrow, Neurology evaluation  Chief Complaint:   Transiently altered mental status and slurred speech    History of Present Illness:    Ta Ho is a 80 y o  male who presents with episode of transient altered mental status and slurred speech  Event was witnessed by the patient's granddaughter who was present at bedside to assist with history  Patient awoke this a m , had normal morning, ate breakfast seem to be his usual self, patient laid down for a nap, woke at approximately 10:30 had episode lasting approximately 30 minutes of slurred speech/nonsensical speech, episode resolved, the family contacted primary care provider who recommended ER evaluation  Please see reviewed documentation from ER physician outlined below    "Altered Mental Status        pt fell x2 weeks ago- hit head and back, pt takes coumadin  pt was d/c home from [facility] with no injuries  pt awoke this morning at 1030 with confusion, mumbling words, disorientation  symptoms resolved around noon  came to ED for eval d/t previous fall  pt disoriented in triage  HPI  79 yo M presents with confusion  He is taking eliquis for PAF  Patient fell on 3/13/19, was evaluated at [facility], had negative CT scans  Patient's INR supratherapeutic at 4 8 on 3/20, 3 1 on 3/25  According to family patient woke up this morning at 10:30 a m  With confusion and slurred speech that lasted a few minutes  Has history of stroke years ago  Patient states he still feels slightly off but is now alert and oriented  No fevers or chills  No abdominal pain, chest pain, shortness of breath, dysuria or frequency  No weakness or numbness "  -- Dr Kassie Valles    Review of Systems:    Review of Systems   All other systems reviewed and are negative        Past Medical and Surgical History:     Past Medical History:   Diagnosis Date    Arthritis     Cardiac disorder     Disease of thyroid gland     Hyperlipidemia     Hypertension     [facility]     [facility]     Stroke ([facility] )     Thyroid disorder        Past Surgical History:   Procedure Laterality Date    CARDIAC PACEMAKER PLACEMENT      COLOSTOMY      HIP SURGERY      LEG SURGERY      incision below knee       Meds/Allergies:    Prior to Admission medications    Medication Sig Start Date End Date Taking? Authorizing Provider   bumetanide (BUMEX) 0 5 MG tablet Take 0 5 mg by mouth daily   Yes Historical Provider, MD   carvedilol (COREG) 12 5 mg tablet Take 12 5 mg by mouth 3 (three) times a day   4/26/16  Yes Historical Provider, MD   donepezil (ARICEPT) 10 mg tablet Take 10 mg by mouth daily at bedtime 1/16/15  Yes Historical Provider, MD   FOLIC ACID PO Take 1 capsule by mouth daily   Yes Historical Provider, MD   levothyroxine 25 mcg tablet Take 25 mcg by mouth daily in the early morning 2/20/15  Yes Historical Provider, MD   simvastatin (ZOCOR) 40 mg tablet Take 40 mg by mouth daily at bedtime  9/29/16  Yes Historical Provider, MD   spironolactone (ALDACTONE) 25 mg tablet Take 12 5 mg by mouth daily  9/29/16  Yes Historical Provider, MD   warfarin (COUMADIN) 4 mg tablet Take 4 mg by mouth daily at bedtime  9/29/16  Yes Historical Provider, MD   diclofenac sodium (VOLTAREN) 1 % Apply 2 g topically 4 (four) times a day for 7 days 10/8/18 10/15/18  Jessica Martinez, DO     I have reviewed home medications with patient personally  Allergies:    Allergies   Allergen Reactions    Prednisone Confusion       Social History:     Marital Status: /Civil Union   Patient normally ambulates with the assistance of a walker, lives at home with his family    Substance Use History:   Social History     Substance and Sexual Activity   Alcohol Use No     Social History     Tobacco Use   Smoking Status Never Smoker   Smokeless Tobacco Never Used   Tobacco Comment    former smoker per [employer]      Social History     Substance and Sexual Activity   Drug Use No       Family History:    non-contributory    Physical Exam:     Vitals:   Blood Pressure: 119/56 (03/26/19 1415)  Pulse: 60 (03/26/19 1430)  Temperature: 98 °F (36 7 °C) (03/26/19 1231)  Temp Source: Oral (03/26/19 1231)  Respirations: 18 (03/26/19 1430)  Height: 5' 7" (170 2 cm) (03/26/19 1231)  Weight - Scale: 104 kg (229 lb 4 5 oz) (03/26/19 1231)  SpO2: 94 % (03/26/19 1430)    Physical Exam   Constitutional: He appears well-developed and well-nourished  No distress  HENT:   Head: Normocephalic and atraumatic  Eyes: Pupils are equal, round, and reactive to light  Conjunctivae and EOM are normal  Right eye exhibits no discharge  Left eye exhibits no discharge  Neck: Normal range of motion  Cardiovascular: Normal rate, regular rhythm, normal heart sounds and intact distal pulses  Pulmonary/Chest: Effort normal and breath sounds normal    Abdominal: Soft  Bowel sounds are normal    Musculoskeletal: He exhibits edema (Trace to +1 pitting lower extremity edema)  Ecchymosis posterior thorax from approximately C6-T2   Neurological: He is alert  He displays normal reflexes  No cranial nerve deficit or sensory deficit  He exhibits normal muscle tone  Coordination normal    Patient oriented to person and place not time or date   Skin: Skin is warm and dry  No rash noted  He is not diaphoretic  No erythema  Nursing note and vitals reviewed  Additional Data:     Lab Results: I have personally reviewed pertinent reports        Results from last 7 days   Lab Units 03/26/19  1310   WBC Thousand/uL 8 13   HEMOGLOBIN g/dL 11 9*   HEMATOCRIT % 37 0   PLATELETS Thousands/uL 244   NEUTROS PCT % 73   LYMPHS PCT % 17   MONOS PCT % 8   EOS PCT % 2     Results from last 7 days   Lab Units 03/26/19  1310   POTASSIUM mmol/L 4 8   CHLORIDE mmol/L 106   CO2 mmol/L 30   BUN mg/dL 24   CREATININE mg/dL 1 33*   CALCIUM mg/dL 8 6   ALK PHOS U/L 76   ALT U/L 18   AST U/L 14     Results from last 7 days   Lab Units 03/26/19  1310   INR  3 29*       Imaging: I have personally reviewed pertinent reports  Xr Chest 2 Views    Result Date: 3/26/2019  Narrative: CHEST INDICATION:   confusion, eval infiltrates  COMPARISON:  None EXAM PERFORMED/VIEWS:  XR CHEST PA & LATERAL 1 images: FINDINGS: Cardiomediastinal silhouette appears unremarkable  The lungs are clear  No pneumothorax or pleural effusion  Osseous structures appear within normal limits for patient age  Impression: No acute cardiopulmonary disease  Workstation performed: [id number]     Ct Head Wo Contrast    Result Date: 3/26/2019  Narrative: CT BRAIN - WITHOUT CONTRAST INDICATION:   Confusion/delirium, altered LOC, unexplained fall on coumadin one week ago, confusio  COMPARISON:  None  TECHNIQUE:  CT examination of the brain was performed  In addition to axial images, coronal 2D reformatted images were created and submitted for interpretation  Radiation dose length product (DLP) for this visit:  900 mGy-cm   This examination, like all CT scans performed in the [facility], was performed utilizing techniques to minimize radiation dose exposure, including the use of iterative reconstruction and automated exposure control  IMAGE QUALITY:  Diagnostic  FINDINGS: PARENCHYMA:  No intracranial mass, mass effect or midline shift  No CT signs of acute infarction  No acute parenchymal hemorrhage  Severe periventricular and white matter hypodensity seen related to chronic small vessel ischemic changes Chronic lacune seen in the left basal ganglionic region, right thalamus and right basal ganglionic region VENTRICLES AND EXTRA-AXIAL SPACES:  Normal for the patient's age  VISUALIZED ORBITS AND PARANASAL SINUSES:  Unremarkable   CALVARIUM AND EXTRACRANIAL SOFT TISSUES:  Normal      Impression: No acute intracranial hemorrhage seen No mass effect or midline shift seen Workstation performed: [id number]       [facility] Records Reviewed:  Yes

## 2019-03-27 ENCOUNTER — APPOINTMENT (OUTPATIENT)
Dept: NON INVASIVE DIAGNOSTICS | Facility: HOSPITAL | Age: 84
End: 2019-03-27
Payer: MEDICARE

## 2019-03-27 VITALS
BODY MASS INDEX: 36.16 KG/M2 | WEIGHT: 230.38 LBS | HEIGHT: 67 IN | TEMPERATURE: 98.42 F | DIASTOLIC BLOOD PRESSURE: 84 MMHG | RESPIRATION RATE: 18 BRPM | OXYGEN SATURATION: 95 % | HEART RATE: 59 BPM | SYSTOLIC BLOOD PRESSURE: 142 MMHG

## 2019-03-27 PROBLEM — F03.90 DEMENTIA ARISING IN THE SENIUM AND PRESENIUM (HCC): Status: ACTIVE | Noted: 2019-03-27

## 2019-03-27 LAB
ALBUMIN SERPL BCP-MCNC: 2.9 G/DL (ref 3.5–5)
ALP SERPL-CCNC: 71 U/L (ref 46–116)
ALT SERPL W P-5'-P-CCNC: 20 U/L (ref 12–78)
ANION GAP SERPL CALCULATED.3IONS-SCNC: 7 MMOL/L (ref 4–13)
AST SERPL W P-5'-P-CCNC: 13 U/L (ref 5–45)
BASOPHILS # BLD AUTO: 0.02 THOUSANDS/ΜL (ref 0–0.1)
BASOPHILS NFR BLD AUTO: 0 % (ref 0–1)
BILIRUB SERPL-MCNC: 0.6 MG/DL (ref 0.2–1)
BUN SERPL-MCNC: 24 MG/DL (ref 5–25)
CALCIUM SERPL-MCNC: 8.7 MG/DL (ref 8.3–10.1)
CHLORIDE SERPL-SCNC: 106 MMOL/L (ref 100–108)
CHOLEST SERPL-MCNC: 111 MG/DL (ref 50–200)
CO2 SERPL-SCNC: 27 MMOL/L (ref 21–32)
CREAT SERPL-MCNC: 1.09 MG/DL (ref 0.6–1.3)
EOSINOPHIL # BLD AUTO: 0.16 THOUSAND/ΜL (ref 0–0.61)
EOSINOPHIL NFR BLD AUTO: 2 % (ref 0–6)
ERYTHROCYTE [DISTWIDTH] IN BLOOD BY AUTOMATED COUNT: 13.2 % (ref 11.6–15.1)
EST. AVERAGE GLUCOSE BLD GHB EST-MCNC: 105 MG/DL
GFR SERPL CREATININE-BSD FRML MDRD: 60 ML/MIN/1.73SQ M
GLUCOSE SERPL-MCNC: 92 MG/DL (ref 65–140)
HBA1C MFR BLD: 5.3 % (ref 4.2–6.3)
HCT VFR BLD AUTO: 35.4 % (ref 36.5–49.3)
HDLC SERPL-MCNC: 36 MG/DL (ref 40–60)
HGB BLD-MCNC: 11.3 G/DL (ref 12–17)
IMM GRANULOCYTES # BLD AUTO: 0.03 THOUSAND/UL (ref 0–0.2)
IMM GRANULOCYTES NFR BLD AUTO: 0 % (ref 0–2)
INR PPP: 2.67 (ref 0.86–1.17)
LDLC SERPL CALC-MCNC: 50 MG/DL (ref 0–100)
LYMPHOCYTES # BLD AUTO: 1.8 THOUSANDS/ΜL (ref 0.6–4.47)
LYMPHOCYTES NFR BLD AUTO: 24 % (ref 14–44)
MAGNESIUM SERPL-MCNC: 2.4 MG/DL (ref 1.6–2.6)
MCH RBC QN AUTO: 30.4 PG (ref 26.8–34.3)
MCHC RBC AUTO-ENTMCNC: 31.9 G/DL (ref 31.4–37.4)
MCV RBC AUTO: 95 FL (ref 82–98)
MONOCYTES # BLD AUTO: 0.75 THOUSAND/ΜL (ref 0.17–1.22)
MONOCYTES NFR BLD AUTO: 10 % (ref 4–12)
NEUTROPHILS # BLD AUTO: 4.9 THOUSANDS/ΜL (ref 1.85–7.62)
NEUTS SEG NFR BLD AUTO: 64 % (ref 43–75)
NRBC BLD AUTO-RTO: 0 /100 WBCS
PHOSPHATE SERPL-MCNC: 3 MG/DL (ref 2.3–4.1)
PLATELET # BLD AUTO: 219 THOUSANDS/UL (ref 149–390)
PMV BLD AUTO: 10.4 FL (ref 8.9–12.7)
POTASSIUM SERPL-SCNC: 4.1 MMOL/L (ref 3.5–5.3)
PROT SERPL-MCNC: 6.1 G/DL (ref 6.4–8.2)
PROTHROMBIN TIME: 28.1 SECONDS (ref 11.8–14.2)
RBC # BLD AUTO: 3.72 MILLION/UL (ref 3.88–5.62)
SODIUM SERPL-SCNC: 140 MMOL/L (ref 136–145)
TRIGL SERPL-MCNC: 123 MG/DL
WBC # BLD AUTO: 7.66 THOUSAND/UL (ref 4.31–10.16)

## 2019-03-27 PROCEDURE — G8988 SELF CARE GOAL STATUS: HCPCS

## 2019-03-27 PROCEDURE — 97163 PT EVAL HIGH COMPLEX 45 MIN: CPT

## 2019-03-27 PROCEDURE — 93306 TTE W/DOPPLER COMPLETE: CPT | Performed by: INTERNAL MEDICINE

## 2019-03-27 PROCEDURE — G8979 MOBILITY GOAL STATUS: HCPCS

## 2019-03-27 PROCEDURE — G8978 MOBILITY CURRENT STATUS: HCPCS

## 2019-03-27 PROCEDURE — 93306 TTE W/DOPPLER COMPLETE: CPT

## 2019-03-27 PROCEDURE — 83036 HEMOGLOBIN GLYCOSYLATED A1C: CPT | Performed by: INTERNAL MEDICINE

## 2019-03-27 PROCEDURE — 97167 OT EVAL HIGH COMPLEX 60 MIN: CPT

## 2019-03-27 PROCEDURE — NC001 PR NO CHARGE: Performed by: NURSE PRACTITIONER

## 2019-03-27 PROCEDURE — 85025 COMPLETE CBC W/AUTO DIFF WBC: CPT | Performed by: INTERNAL MEDICINE

## 2019-03-27 PROCEDURE — 99217 PR OBSERVATION CARE DISCHARGE MANAGEMENT: CPT | Performed by: INTERNAL MEDICINE

## 2019-03-27 PROCEDURE — 80061 LIPID PANEL: CPT | Performed by: INTERNAL MEDICINE

## 2019-03-27 PROCEDURE — 83735 ASSAY OF MAGNESIUM: CPT | Performed by: INTERNAL MEDICINE

## 2019-03-27 PROCEDURE — G8987 SELF CARE CURRENT STATUS: HCPCS

## 2019-03-27 PROCEDURE — 84100 ASSAY OF PHOSPHORUS: CPT | Performed by: INTERNAL MEDICINE

## 2019-03-27 PROCEDURE — 80053 COMPREHEN METABOLIC PANEL: CPT | Performed by: INTERNAL MEDICINE

## 2019-03-27 PROCEDURE — 85610 PROTHROMBIN TIME: CPT | Performed by: INTERNAL MEDICINE

## 2019-03-27 RX ADMIN — CARVEDILOL 12.5 MG: 12.5 TABLET, FILM COATED ORAL at 08:16

## 2019-03-27 RX ADMIN — ASPIRIN 81 MG 81 MG: 81 TABLET ORAL at 08:15

## 2019-03-27 RX ADMIN — LEVOTHYROXINE SODIUM 25 MCG: 25 TABLET ORAL at 05:42

## 2019-03-27 NOTE — UTILIZATION REVIEW
Initial Clinical Review    Admission: Date/Time/Statement: OBS   3/26  1403  Orders Placed This Encounter   Procedures    Place in Observation (expected length of stay for this patient is less than two midnights)     Standing Status:   Standing     Number of Occurrences:   1     Order Specific Question:   Admitting Physician     Answer:   Lorraine Mota     Order Specific Question:   Level of Care     Answer:   Med Surg [16]     ED: Date/Time/Mode of Arrival:   ED Arrival Information     Expected Arrival Acuity Means of Arrival Escorted By Service Admission Type    - 3/26/2019 12:24 Emergent Wheelchair Family Member Hospitalist Emergency    Arrival Complaint    Altered mental status        Chief Complaint:   Chief Complaint   Patient presents with    Altered Mental Status     pt fell x2 weeks ago- hit head and back, pt takes coumadin  pt was d/c home from [facility] with no injuries  pt awoke this morning at 1030 with confusion, mumbling words, disorientation  symptoms resolved around noon  came to ED for eval d/t previous fall  pt disoriented in triage  Assessment/Plan:   81 yo male to ED from, home w/ confusion and slurred speech per family   Started at 1030 am   Feels slightly off at this time , but oriented   Shanice Brink 2 weeks ago and seen at [facility] and [place] home  Pt is on coumadin   * Acute encephalopathy  Assessment & Plan  Patient had transiently altered mental status this a m  At approximately 10:30, also associated with garbled speech, symptoms reolved after approximately 30 minutes      Stroke order set initiated, no MRI due to pacemaker, patient has had complete resolution of symptoms, will monitor on telemetry, give aspirin 81 mg x1 now, not likely require aspirin at discharge disease chronically on Coumadin    Neuro checks per protocol    Check hemoglobin A1c, check fasting lipid profile    Patient sustained a fall about 2 weeks ago with closed traumatic head injury    Elevated serum creatinine  Assessment & Plan  Unknown baseline creatinine, check renal ultrasound   Normal saline at 75 mL/hour x1 L ordered, hold Bumex and Aldactone, follow up a m  Labs   Fall  Assessment & Plan  In sustained mechanical fall approximately 2 weeks ago, was evaluated at [facility], had trauma evaluation and was discharged home from the emergency room    Acquired hypothyroidism  Assessment & Plan  Check TSH   History of Coumadin therapy  Assessment & Plan  INR is slightly supratherapeutic, hold tonight's dose and resume tomorrow   Essential hypertension  Assessment & Plan  Continue with Coreg, monitor blood pressure  Holding diuretics      VTE Prophylaxis: Warfarin (Coumadin)  / sequential compression device   Code Status:  Full code  POLST: There is no POLST form on file for this patient (pre-hospital)   Anticipated Length of Stay:  Patient will be admitted on an Observation basis with an anticipated length of stay of  less than 2 midnights  Justification for Hospital Stay:  Patient had transiently altered mental status, slurred speech, needs further diagnostic workup, monitoring, PT/OT eval tomorrow, Neurology evaluation        ED Vital Signs:   ED Triage Vitals [03/26/19 1231]   Temperature Pulse Respirations Blood Pressure SpO2   98 °F (36 7 °C) 60 16 118/67 96 %      Temp Source Heart Rate Source Patient Position - Orthostatic VS BP Location FiO2 (%)   Oral Monitor Sitting Left arm --      Pain Score       No Pain        Wt Readings from Last 1 Encounters:   03/27/19 104 kg (230 lb 6 1 oz)     Vital Signs (abnormal):  wnl   Pertinent Labs/Diagnostic Test Results: BUN creat  24  1 33 alb   2 9  PT INR   33 0  3 29  ptt 50  H&H   11 9  37 0  US kidney -      Left mid pole renal 3 4 x 2 4 cm simple cyst       CXR -wnl   CT head- wnl   ED Treatment:   Medication Administration from 03/26/2019 1224 to 03/26/2019 1562       Date/Time Order Dose Route Action Action by Comments     03/26/2019 0444 carvedilol (COREG) tablet 12 5 mg 12 5 mg Oral Given Jay Ibarra RN      03/26/2019 1646 pravastatin (PRAVACHOL) tablet 80 mg 80 mg Oral Given Jay Ibarra RN      03/26/2019 1549 sodium chloride 0 9 % infusion 75 mL/hr Intravenous [address] Jay Ibarra RN      03/26/2019 1543 aspirin chewable tablet 81 mg 81 mg Oral Given Jay Ibarra RN         Past Medical/Surgical History:    Active Ambulatory Problems     Diagnosis Date Noted    No Active Ambulatory Problems       Past Medical History:   Diagnosis Date    Arthritis     Cardiac disorder     Disease of thyroid gland     Hyperlipidemia     Hypertension     [facility]     [facility]el[facility]rum [facility]     Stroke (HCC)     Thyroid disorder      Admitting Diagnosis: Slurred speech [R47 81]  Confusion [R41 0]  Altered mental status [R41 82]  History of CVA (cerebrovascular accident) [Z86 73]  Acute encephalopathy [G93 40]  Age/Sex: 80 y o  male  Admission Orders:  Scheduled Meds:   Current Facility-Administered Medications:  aspirin 81 mg Oral Daily    carvedilol 12 5 mg Oral BID With Meals    donepezil 10 mg Oral HS    levothyroxine 25 mcg Oral Early Morning    pravastatin 80 mg Oral Daily With Dinner    warfarin 4 mg Oral HS      SCD  I&O   Daily weight   Up and oOB as aldo   Dysphagia eval   Tele   Neuro checks   Neuro consult   OT PT eval   Speech eval   3/27 cbc , pt inr , phos ,mg , cmp   Carotid US L < 50 % stenosis   R clear

## 2019-03-27 NOTE — OCCUPATIONAL THERAPY NOTE
Occupational Therapy Evaluation      Jamila Velásquez    3/27/2019    Patient Active Problem List   Diagnosis    Fall    Acute encephalopathy    Elevated serum creatinine    History of Coumadin therapy    Essential hypertension    Acquired hypothyroidism    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       Past Medical History:   Diagnosis Date    Arthritis     Cardiac disorder     Disease of thyroid gland     Hyperlipidemia     Hypertension     [facility] [address]     [facility]omyelitis of sacrum [facility] [address] [facility]     Stroke ([facility] )     Thyroid disorder        Past Surgical History:   Procedure Laterality Date    CARDIAC PACEMAKER PLACEMENT      HIP SURGERY      LEG SURGERY      incision below knee      03/27/19 0910   Note Type   Note type Eval/Treat   Restrictions/Precautions   Weight Bearing Precautions Per Order No   Other Precautions Telemetry; Fall Risk;Pain   Pain Assessment   Pain Assessment 0-10   Pain Score 5   Pain Type Acute pain   Pain Location Back   Pain Orientation Lower   Pain Descriptors Aching   Pain Frequency Intermittent   Pain Onset Ongoing   Clinical Progression Not changed   Effect of Pain on Daily Activities worse w/ mobility   Patient's Stated Pain Goal No pain   Home Living   Type of Home House  (1st floor setup in basement level available if needed)   Home Layout Two level;Bed/bath upstairs  (3 SPENCER; + 7 additional steps up to living level)   Bathroom Shower/Tub Tub/shower unit   [employer] Tub transfer bench;Grab bars in shower;Grab bars around toilet;Hand-held shower  (sliding tub bench)   [address] [facility]; Wheelchair-manual  (pt uses RW in the home; lift recliner)   Additional Comments bi-level home; A from family for going up/down steps provided at baseline   Prior Function   Level of [place] Needs assistance with ADLs and functional mobility; Needs assistance with IADLs   Lives With Family  (dtr Nimisha Martinez - pt's caregiver; pt is never home alone)   Receives Help From Family   ADL Assistance Needs assistance  (A with bathing; toilets and dresses independently)   IADLs Needs assistance  (dtr cooks, cleans)   Falls in the last 6 months 1 to 4   Vocational Retired  (contractor)   Comments pt's granddtr present at bedside, reports pt lives with his dtr Adriano Jiménez, while she works other family members come to the home to provide S/A prn for patient   Lifestyle   Autonomy Pt and family report PLOF was A with ADLs, IADLs, and not driving   Reciprocal Relationships dtr, granddaughter, and grandson in law   Psychosocial   Psychosocial (WDL) WDL   ADL   Eating Assistance 6  Modified independent   Eating Deficit Increased time to complete   Grooming Assistance 6  Modified Independent   Grooming Deficit Increased time to complete   UB Bathing Assistance 4  Minimal Assistance   UB Bathing Deficit Increased time to complete;Supervision/safety   LB Bathing Assistance 3  Moderate Assistance   LB Bathing Deficit Supervision/safety; Increased time to complete;Steadying;Setup   UB Dressing Assistance 5  Supervision/Setup   UB Dressing Deficit Increased time to complete   LB Dressing Assistance 4  Minimal Assistance   LB Dressing Deficit Increased time to complete;Supervision/safety;Steadying;Setup   Toileting Assistance  4  Minimal Assistance   Toileting Deficit Increased time to complete;Supervison/safety;Setup   Functional Assistance 4  Minimal Assistance   Functional Deficit Setup;Steadying;Supervision/safety; Increased time to complete   Bed Mobility   Rolling R Unable to assess   Additional Comments Pt was oob to chair when OT arrived   Transfers   Sit to Stand 4  Minimal assistance   Additional items Assist x 1; Armrests; Increased time required   Stand to Sit 4  Minimal assistance   Additional items Assist x 1; Armrests; Increased time required   Balance   Static Sitting Good   Dynamic Sitting Fair +   Static Standing Fair   Dynamic Standing [address] -   Ambulatory Fair -   Activity Tolerance   Activity Tolerance Patient limited by fatigue;Patient limited by pain   Nurse Made Aware Yes, RN Francis Case verbalized pt appropriate for OT session, made aware of outcomes/recs   RUE Assessment   RUE Assessment WFL   LUE Assessment   LUE Assessment WFL   Hand Function   Gross Motor Coordination Functional   Fine Motor Coordination Functional   Sensation   Light Touch No apparent deficits   Sharp/Dull No apparent deficits   Stereognosis No apparent deficits   Proprioception   Proprioception No apparent deficits   Vision - Complex Assessment   Ocular Range of Motion WFL   Acuity Able to read clock/calendar on wall without difficulty   Perception   Inattention/Neglect Appears intact   Cognition   Overall Cognitive Status WFL   Arousal/Participation Alert;Arousable; Cooperative   Attention Within functional limits   Orientation Level Oriented X4   Memory Within functional limits   Following Commands Follows all commands and directions without difficulty   Comments Pt was agreeable to OT eval   Assessment   Limitation Decreased ADL status; Decreased self-care trans;Decreased high-level ADLs; Decreased UE strength;Decreased Safe judgement during ADL;Decreased endurance   Prognosis Good   Assessment Pt is a 80 y o  male seen for OT evaluation s/p admit to [facility] on 3/26/2019 w/ Acute encephalopathy  Comorbidities affecting pt's functional performance at time of assessment include:dementia, fall history and HTN   Orders placed for OT evaluation and treatment and "up and oob as aldo" order  Performed at least two patient identifiers during session including name and wristband  Personal factors affecting pt at time of IE include:steps to enter environment, difficulty performing ADLS, difficulty performing IADLS , limited insight into deficits, decreased initiation and engagement , financial barriers, health management  and environment   Prior to admission, pt reports A with ADLs, A with IADLs, and (-) driving  Upon evaluation: Pt requires min A with UB ADLs, mod A with LB ADLs, min A with xfers and min a with functional mobility 2* the following deficits impacting occupational performance: weakness, decreased strength, decreased dynamic sit/ stand balance, decreased activity tolerance, decreased standing tolerance time for self care and functional mobility, impaired initiation, impaired sequencing, impaired problem solving, decreased safety awareness, impaired interpersonal skills, environmental deficits, decreased mobilty and requiring external assistance to complete transitional movements  Pt to benefit from continued skilled OT tx while in the hospital to address deficits as defined above and maximize level of functional independence w ADL's and functional mobility  Occupational Performance areas to address include: bathing/shower, toilet hygiene, dressing, medication management, health maintenance, functional mobility, community mobility and household maintenance  From OT standpoint, recommendation at time of d/c would be home with OT and 24/7 family support  Goals   Patient Goals to go home   Plan   Treatment Interventions ADL retraining; Activityengagement; Energy conservation;Cardiac education;Continued evaluation; Compensatory technique education;Equipment evaluation/education;Patient/family training; Endurance training;Functional transfer training;UE strengthening/ROM   Goal Expiration Date 04/09/19   OT Frequency 3-5x/wk   Recommendation   OT Discharge Recommendation Home OT  (and 24/7 S and support)   OT - OK to Discharge Yes  (when medically cleared)   Barthel Index   Feeding 10   Bathing 0   Grooming Score 5   Dressing Score 5   Bladder Score 10   Bowels Score 10   Toilet Use Score 5   Transfers (Bed/Chair) Score 10   Mobility (Level Surface) Score 0   Stairs Score 0   Barthel Index Score 55   Modified [place] Scale   Modified [place] Scale 4     Occupational Therapy goals:  In 7-14 days:    1 - Patient will verbalize and demonstrate use of energy conservation/ deep breathing technique and work simplification skills during functional activity with no verbal cues  2 - Patient will verbalize and demonstrate good body mechanics and joint protection techniques during  ADLs/ IADLs with no verbal cues  3 - Patient will increase OOB/ sitting tolerance to 2-4 hours per day for increased participation in self care and leisure tasks with no s/s of exertion  4 - Patient will increase standing tolerance time to 5  minutes with unilateral UE support to complete sink level ADLs@ mod I level  5 - Patient will increase sitting tolerance at edge of bed to 20 minutes to complete UB ADLs @ set up assist level  6 - Patient will transfer bed to Chair / toilet at Set up assist level with AD as indicated  7 - Patient will complete UB ADLs with set up assist     8 - Patient will complete LB ADLs with S with use of adaptive equipment  9 - Patient will complete toileting hygiene with supervision for thoroughness      10 - Patient/ Family  will demonstrate competency [facility]

## 2019-03-27 NOTE — ASSESSMENT & PLAN NOTE
The patient is therapeutic on today's visit  He is monitored at home with the oversight by his cardiologist's office to [facility]   His family is rather diligent and includes 1 or 2 healthcare workers in over seen his Coumadin therapy  We would not suggest any changes at this time

## 2019-03-27 NOTE — ASSESSMENT & PLAN NOTE
The episode of confusion that this patient experienced today was approximately 30-45 minutes total   Although the onset of the episode was unwitnessed, as the granddaughter was in the shower, I suspect that the patient woke from his 1st nap of the day, in the morning and not finding or seen any family members became somewhat anxious and confused  With the presence of both his daughter and granddaughter he was able to participate again in clear conversation  There was no aphasia, however there was by the granddaughter's report a short  Under 2-3 minutes where she reports the patient was worked up to the point of having word-finding difficulties  Given his known dementia, see below I suspect that this was not a vascular event but rather an emotionally triggered cognitive processing event related to his dementia  His exam remains nonfocal in all aspects  His CT scan demonstrates no new changes suggesting an infarct or a sequela from his fall 3 weeks ago  Because of his pacemaker he cannot have an MRI at this [place]  His return to normal already noted yesterday and continued throughout the evening and so far this morning is reassuring that he does not need an MRI  There was no suggestion of an epileptic event either by the report of the event or in his exam   I did suggest to the granddaughter who is present at the bedside that he follow up with his PCP in approximately 1 week or so and that he could make an appointment with 1 of our neurologists at our [address] office in the upcoming month as follow-up  At this time there is no further neurologic workup necessary

## 2019-03-27 NOTE — ASSESSMENT & PLAN NOTE
His family monitors his blood pressure and they report that he has gradually of been declining from elevated pressures to continually normal and occasionally low blood pressures  She reports that his blood pressure yesterday upon awakening was in the low 1'teens  Although they did not check his blood pressure at the time of this event they do report that he was somewhat pale but recovered quickly  I do not see that he needs orthostatics at this time

## 2019-03-27 NOTE — PLAN OF CARE
Problem: Potential for Falls  Goal: Patient will remain free of falls  Description  INTERVENTIONS:  - Assess patient frequently for physical needs  -  Identify cognitive and physical deficits and behaviors that affect risk of falls  -  [place] fall precautions as indicated by assessment   - Educate patient/family on patient safety including physical limitations  - Instruct patient to call for assistance with activity based on assessment  - Modify environment to reduce risk of injury  - Consider OT/PT consult to assist with strengthening/mobility  Outcome: Progressing     Problem: NEUROSENSORY - ADULT  Goal: Achieves stable or improved neurological status  Description  INTERVENTIONS  - Monitor and report changes in neurological status  - Initiate measures to prevent increased intracranial pressure  - Maintain blood pressure and fluid volume within ordered parameters to optimize cerebral perfusion  - Monitor temperature, glucose, and sodium or any other associated labs   Initiate appropriate interventions as ordered  - Monitor for seizure activity   - Administer anti-seizure medications as ordered  Outcome: Progressing  Goal: Achieves maximal functionality and self care  Description  INTERVENTIONS  - Monitor swallowing and airway patency with patient fatigue and changes in neurological status  - Encourage and assist patient to increase activity and self care with guidance from rehab services  - Encourage visually impaired, hearing impaired and aphasic patients to use assistive/communication devices  Outcome: Progressing     Problem: PAIN - ADULT  Goal: Verbalizes/displays adequate comfort level or baseline comfort level  Description  Interventions:  - Encourage patient to monitor pain and request assistance  - Assess pain using appropriate pain scale  - Administer analgesics based on type and severity of pain and evaluate response  - Implement non-pharmacological measures as appropriate and evaluate response  - Consider cultural and social influences on pain and pain management  - Notify physician/advanced practitioner if interventions unsuccessful or patient reports new pain  Outcome: Progressing     Problem: SAFETY ADULT  Goal: Maintain or return to baseline ADL function  Description  INTERVENTIONS:  -  Assess patient's ability to carry out ADLs; assess patient's baseline for ADL function and identify physical deficits which impact ability to perform ADLs (bathing, care of mouth/teeth, toileting, grooming, dressing, etc )  - Assess/evaluate cause of self-care deficits   - Assess range of motion  - Assess patient's mobility; develop plan if impaired  - Assess patient's need for assistive devices and provide as appropriate  - Encourage maximum independence but intervene and supervise when necessary  ¯ Involve family in performance of ADLs  ¯ Assess for home care needs following discharge   ¯ Request OT consult to assist with ADL evaluation and planning for discharge  ¯ Provide patient education as appropriate  Outcome: Progressing  Goal: Maintain or return mobility status to optimal level  Description  INTERVENTIONS:  - Assess patient's baseline mobility status (ambulation, transfers, stairs, etc )    - Identify cognitive and physical deficits and behaviors that affect mobility  - Identify mobility aids required to assist with transfers and/or ambulation (gait belt, sit-to-stand, lift, walker, cane, etc )  - [place] fall precautions as indicated by assessment  - Record patient progress and toleration of activity level on Mobility SBAR; progress patient to next Phase/Stage  - Instruct patient to call for assistance with activity based on assessment  - Request Rehabilitation consult to assist with strengthening/weightbearing, etc   Outcome: Progressing

## 2019-03-27 NOTE — PLAN OF CARE
Problem: OCCUPATIONAL THERAPY ADULT  Goal: Performs self-care activities at highest level of function for planned discharge setting  See evaluation for individualized goals  Description  Treatment Interventions: ADL retraining, Activityengagement, Energy conservation, Cardiac education, Continued evaluation, Compensatory technique education, Equipment evaluation/education, Patient/family training, Endurance training, Functional transfer training, UE strengthening/ROM          See flowsheet documentation for full assessment, interventions and recommendations  Note:   Limitation: Decreased ADL status, Decreased self-care trans, Decreased high-level ADLs, Decreased UE strength, Decreased Safe judgement during ADL, Decreased endurance  Prognosis: Good  Assessment: Pt is a 80 y o  male seen for OT evaluation s/p admit to [facility] on 3/26/2019 w/ Acute encephalopathy  Comorbidities affecting pt's functional performance at time of assessment include:dementia, fall history and HTN   Orders placed for OT evaluation and treatment and "up and oob as aldo" order  Performed at least two patient identifiers during session including name and wristband  Personal factors affecting pt at time of IE include:steps to enter environment, difficulty performing ADLS, difficulty performing IADLS , limited insight into deficits, decreased initiation and engagement , financial barriers, health management  and environment  Prior to admission, pt reports A with ADLs, A with IADLs, and (-) driving    Upon evaluation: Pt requires min A with UB ADLs, mod A with LB ADLs, min A with xfers and min a with functional mobility 2* the following deficits impacting occupational performance: weakness, decreased strength, decreased dynamic sit/ stand balance, decreased activity tolerance, decreased standing tolerance time for self care and functional mobility, impaired initiation, impaired sequencing, impaired problem solving, decreased safety awareness, impaired interpersonal skills, environmental deficits, decreased mobilty and requiring external assistance to complete transitional movements  Pt to benefit from continued skilled OT tx while in the hospital to address deficits as defined above and maximize level of functional independence w ADL's and functional mobility  Occupational Performance areas to address include: bathing/shower, toilet hygiene, dressing, medication management, health maintenance, functional mobility, community mobility and household maintenance  From OT standpoint, recommendation at time of d/c would be home with OT and 24/7 family support         OT Discharge Recommendation: Home OT(and 24/7 S and support)  OT - OK to Discharge: Yes(when medically cleared)

## 2019-03-27 NOTE — PHYSICAL THERAPY NOTE
Physical Therapy Evaluation     Patient's Name: Curtis King    Admitting Diagnosis  Slurred speech [R47 81]  Confusion [R41 0]  Altered mental status [R41 82]  History of CVA (cerebrovascular accident) [Z86 73]  Acute encephalopathy [G93 40]    Problem List  Patient Active Problem List   Diagnosis    Fall    Acute encephalopathy    Elevated serum creatinine    History of Coumadin therapy    Essential hypertension    Acquired hypothyroidism    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       Past Medical History  Past Medical History:   Diagnosis Date    Arthritis     Cardiac disorder     Disease of thyroid gland     Hyperlipidemia     Hypertension     [facility] [address] [facility]     [facility]     Stroke ([facility] )     Thyroid disorder        Past Surgical History  Past Surgical History:   Procedure Laterality Date    CARDIAC PACEMAKER PLACEMENT      HIP SURGERY      LEG SURGERY      incision below knee        03/27/19 0911   Note Type   Note type Eval/Treat   Pain Assessment   Pain Assessment 0-10   Pain Score 5  (only w/ mobility)   Pain Type Acute pain   Pain Location Back   Pain Orientation Lower   Pain Descriptors Aching   Pain Frequency Intermittent   Pain Onset Ongoing   Clinical Progression Not changed   Effect of Pain on Daily Activities worse w/ mobility   Patient's Stated Pain Goal No pain   Home Living   Type of Home House  (1st floor setup in basement level available if needed)   Home Layout Two level;Bed/bath upstairs  (3 SPENCER; + 7 additional steps up to living level)   Bathroom Shower/Tub Tub/shower unit   [employer] Tub transfer bench;Grab bars in shower;Grab bars around toilet;Hand-held shower  (sliding tub bench)   [facility]; Wheelchair-manual;Cane  (pt uses RW in the home; lift recliner)   Additional Comments bi-level home; A from family for going up/down steps provided at baseline   Prior Function Level of [place] Needs assistance with ADLs and functional mobility; Needs assistance with IADLs   Lives With Family  (dtr Boby Roldan - pt's caregiver; pt is never home alone)   Receives Help From Family   ADL Assistance Needs assistance  (A with bathing; toilets and dresses independently)   IADLs Needs assistance  (dtr cooks, cleans)   Falls in the last 6 months 1 to 4   Vocational Retired  (contractor)   Comments pt's granddtr present at bedside, reports pt lives with his dtr Boby Roldan, while she works other family members come to the home to provide S/A prn for patient   Restrictions/Precautions   Weight Bearing Precautions Per Order No   Other Precautions Telemetry; Fall Risk;Pain   General   Family/Caregiver Present Yes  (granddtr)   Cognition   Overall Cognitive Status WFL   Arousal/Participation Alert   Orientation Level Oriented X4   Memory Within functional limits   Following Commands Follows all commands and directions without difficulty   Comments pt agreeable to PT session   RLE Assessment   RLE Assessment WFL   LLE Assessment   LLE Assessment WFL   Coordination   Movements are Fluid and Coordinated 1   Sensation WFL   Bed Mobility   Rolling R Unable to assess   Additional Comments pt received OOB in recliner upon arrival   Transfers   Sit to Stand 4  Minimal assistance   Additional items Assist x 1; Armrests; Increased time required   Stand to Sit 4  Minimal assistance   Additional items Assist x 1; Armrests; Verbal cues   Ambulation/Elevation   Gait pattern Improper Weight shift; Forward Flexion;Decreased foot clearance;Shuffling   Gait Assistance 4  Minimal assist   Additional items Assist x 1;Verbal cues; Tactile cues   Assistive Device Rolling walker   Distance 30 ft   Stair Management Assistance Not tested   Balance   Static Sitting Good   Dynamic Sitting Fair +   Static Standing Fair   Dynamic Standing Fair -   Ambulatory Fair -   Endurance Deficit   Endurance Deficit Yes   Activity Tolerance   Activity Tolerance Patient limited by fatigue;Patient limited by pain   Medical Staff Made Aware pt w/ up and OOB as tolerated order   Nurse Made Aware Yes, RN Aleksandar Ivory verbalized pt appropriate for PT session, made aware of outcomes/recs   Assessment   Prognosis Good   Problem List Decreased strength;Decreased endurance; Impaired balance;Decreased mobility;Pain; Impaired hearing   Assessment Pt is 80 y o  male seen for PT evaluation on 3/27/2019 s/p admit to [place] on 3/26/2019 w/ Acute encephalopathy  PT consulted to assess pt's functional mobility and d/c needs  Order placed for PT eval and tx, w/ up and OOB as tolerated order  Performed at least 2 patient identifiers during session: Name and wristband  Comorbidities affecting pt's physical performance at time of assessment include: elevated serum creatinine, s/p fall, acquired hypothyroidism, essential HTN, dementia  PTA, pt was independent w/ all functional mobility w/ RW, ambulates household distances, has 3+7 SPENCER, lives w/ dtr in bi- level home and retired  Personal factors affecting pt at time of IE include: lives in multi story house, ambulating w/ assistive device, stairs to enter home, inability to navigate level surfaces w/o external assistance, unable to perform dynamic tasks in community, positive fall history, hearing impairments, decreased initiation and engagement and limited insight into impairments  Please find objective findings from PT assessment regarding body systems outlined above with impairments and limitations including weakness, impaired balance, decreased endurance, pain, decreased activity tolerance and fall risk, as well as mobility assessment (need for minimal assist w/ all phases of mobility when usually ambulating independently and need for cueing for mobility technique)  The following objective measures performed on IE also reveal limitations: Barthel Index: 55/100   Pt's clinical presentation is currently unstable/unpredictable seen in pt's presentation of ongoing medical assessment  Pt to benefit from continued PT tx to address deficits as defined above and maximize level of functional independent mobility and consistency  From PT/mobility standpoint, recommendation at time of d/c would be Home PT with family support pending progress in order to facilitate return to PLOF  Barriers to Discharge Inaccessible home environment   Goals   Patient Goals "to return home"   [facility] Expiration Date 04/06/19   Short Term Goal #1 In 7-10 days: Increase bilateral LE strength 1/2 grade to facilitate independent mobility, Perform all bed mobility tasks modified independent to decrease caregiver burden, Perform all transfers modified independent to improve independence, Ambulate > 100 ft  with RW with distant S w/o LOB and w/ normalized gait pattern 100% of the time, Navigate 3+7 stairs with min A of 1 with bilateral handrails to facilitate return to previous living environment, Increase all balance 1/2 grade to decrease risk for falls, Complete exercise program independently and Tolerate 4 hr OOB to faciliate upright tolerance   Treatment Day 0   Plan   Treatment/Interventions Functional transfer training;LE strengthening/ROM; Elevations; Therapeutic exercise; Endurance training;Patient/family training;Equipment eval/education; Bed mobility;Gait training;Spoke to nursing   PT Frequency   (3-5x/wk)   Recommendation   Recommendation Home PT; Home with family support   Equipment Recommended Walker   Barthel Index   Feeding 10   Bathing 0   Grooming Score 5   Dressing Score 5   Bladder Score 10   Bowels Score 10   Toilet Use Score 5   Transfers (Bed/Chair) Score 10   Mobility (Level Surface) Score 0   Stairs Score 0   Barthel Index Score 55           Ady Sis, PT

## 2019-03-27 NOTE — PLAN OF CARE
Problem: DISCHARGE PLANNING - CARE MANAGEMENT  Goal: Discharge to post-acute care or home with appropriate resources  Description  INTERVENTIONS:  - Conduct assessment to determine patient/family and health care team treatment goals, and need for post-acute services based on payer coverage, community resources, and patient preferences, and barriers to discharge  - Address psychosocial, clinical, and financial barriers to discharge as identified in assessment in conjunction with the patient/family and health care team  - Arrange appropriate level of post-acute services according to patient?s   needs and preference and payer coverage in collaboration with the physician and health care team  - Communicate with and update the patient/family, physician, and health care team regarding progress on the discharge plan  - Arrange appropriate transportation to post-acute venues  Outcome: Progressing  Note:   Obs letter provided  Home with [facility] [place]

## 2019-03-27 NOTE — PLAN OF CARE
Problem: PHYSICAL THERAPY ADULT  Goal: Performs mobility at highest level of function for planned discharge setting  See evaluation for individualized goals  Description  Treatment/Interventions: Functional transfer training, LE strengthening/ROM, Elevations, Therapeutic exercise, Endurance training, Patient/family training, Equipment eval/education, Bed mobility, Gait training, Spoke to nursing  Equipment Recommended: Brittanie Maradiaga       See flowsheet documentation for full assessment, interventions and recommendations  Note:   Prognosis: Good  Problem List: Decreased strength, Decreased endurance, Impaired balance, Decreased mobility, Pain, Impaired hearing  Assessment: Pt is 80 y o  male seen for PT evaluation on 3/27/2019 s/p admit to [facility] on 3/26/2019 w/ Acute encephalopathy  PT consulted to assess pt's functional mobility and d/c needs  Order placed for PT eval and tx, w/ up and OOB as tolerated order  Performed at least 2 patient identifiers during session: Name and wristband  Comorbidities affecting pt's physical performance at time of assessment include: elevated serum creatinine, s/p fall, acquired hypothyroidism, essential HTN, dementia  PTA, pt was independent w/ all functional mobility w/ RW, ambulates household distances, has 3+7 SPENCER, lives w/ dtr in bi- level home and retired  Personal factors affecting pt at time of IE include: lives in multi story house, ambulating w/ assistive device, stairs to enter home, inability to navigate level surfaces w/o external assistance, unable to perform dynamic tasks in community, positive fall history, hearing impairments, decreased initiation and engagement and limited insight into impairments   Please find objective findings from PT assessment regarding body systems outlined above with impairments and limitations including weakness, impaired balance, decreased endurance, pain, decreased activity tolerance and fall risk, as well as mobility assessment (need for minimal assist w/ all phases of mobility when usually ambulating independently and need for cueing for mobility technique)  The following objective measures performed on IE also reveal limitations: Barthel Index: 55/100  Pt's clinical presentation is currently unstable/unpredictable seen in pt's presentation of ongoing medical assessment  Pt to benefit from continued PT tx to address deficits as defined above and maximize level of functional independent mobility and consistency  From PT/mobility standpoint, recommendation at time of d/c would be Home PT with family support pending progress in order to facilitate return to PLOF  Barriers to Discharge: Inaccessible home environment     Recommendation: Home PT, Home with family support          See flowsheet documentation for full assessment

## 2019-03-27 NOTE — ASSESSMENT & PLAN NOTE
Mild elevation with a return to normal after hydration upon admission  I do not believe that the mild elevation that he had would have been enough to trigger a confusional episode

## 2019-03-27 NOTE — DISCHARGE SUMMARY
Discharge Summary - [facility] Internal Medicine    Patient Information: Salazar Byrne 80 y o  male MRN: [id number]  Unit/Bed#: -01 Encounter: [id number]    Discharging Physician / Practitioner: Damion Jose MD  PCP: Bairon William MD  Admission Date: 3/26/2019  Discharge Date: 03/27/19    Disposition:     Home    Reason for Admission:  Acute encephalopathy likely worsening of dementia    Discharge Diagnoses:     Principal Problem:    Acute encephalopathy  Active Problems:    Fall    Elevated serum creatinine    History of Coumadin therapy    Essential hypertension    Acquired hypothyroidism    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  Resolved Problems:    * No resolved hospital problems  *      Consultations During Hospital Stay:  · Neurology    Procedures Performed:     · None    Significant Findings / Test Results:   · CT of the head no acute intracranial abnormality  · X-ray of the chest no acute cardio pulmonary disease  · Ultrasound of the kidney and bladder  Left mid pole renal 3 4 x 2 4 cm simple cyst  Ultrasound of the carotids    Incidental Findings:   · None    Test Results Pending at Discharge (will require follow up): · None     Outpatient Tests Requested:  · None    Complications:  None    Hospital Course:     Salazar Byrne is a 80 y o  male with past medical history of hypertension, AFib, Alzheimer's dementia patient who originally presented to the hospital on 3/26/2019 due to   Transient altered mental status and slurred speech  His symptoms lasted for few minutes and then resolved completely  Patient was initially placed on stroke pathway CT of the head was negative for acute abnormality  He was evaluated by Neurology who believes all the symptoms likely secondary to dementia  No further intervention was recommended  Patient was discharged home to have close follow-up with PCP/Neurology  All other chronic medical problems have been stable    Plan of care was discussed with patient's daughter over the phone granddaughter at the bedside  Condition at Discharge: stable     Discharge Day Visit / Exam:     Subjective:  Patient seen and examined  No complaints at this time  Vitals: Blood Pressure: 142/84 (03/27/19 0813)  Pulse: 59 (03/27/19 0813)  Temperature: 98 42 °F (36 9 °C) (03/27/19 0813)  Temp Source: Oral (03/27/19 0359)  Respirations: 18 (03/27/19 0813)  Height: 5' 7" (170 2 cm) (03/27/19 1122)  Weight - Scale: 104 kg (230 lb 6 1 oz) (03/27/19 1122)  SpO2: 95 % (03/27/19 0813)  Exam:   Physical Exam   Constitutional: He is oriented to person, place, and time  He appears well-developed and well-nourished  HENT:   Head: Normocephalic and atraumatic  Eyes: Pupils are equal, round, and reactive to light  EOM are normal    Neck: Normal range of motion  Neck supple  Cardiovascular: Normal rate and regular rhythm  Pulmonary/Chest: Effort normal and breath sounds normal    Abdominal: Soft  Bowel sounds are normal    Musculoskeletal: Normal range of motion  Neurological: He is alert and oriented to person, place, and time  Skin: Skin is warm and dry  Discussion with Family:  Daughter over the phone    Discharge instructions/Information to patient and family:   See after visit summary for information provided to patient and family  Provisions for Follow-Up Care:  See after visit summary for information related to follow-up care and any pertinent home health orders  Planned Readmission:  None     Discharge Statement:  I spent  30 minutes discharging the patient  This time was spent on the day of discharge  I had direct contact with the patient on the day of discharge  Greater than 50% of the total time was spent examining patient, answering all patient questions, arranging and discussing plan of care with patient as well as directly providing post-discharge instructions  Additional time then spent on discharge activities      Discharge Medications:  See after visit summary for reconciled discharge medications provided to patient and family        ** Please Note: This note has been constructed using a voice recognition system **

## 2019-03-27 NOTE — CONSULTS
This consult was cancelled, before the consult, exam or note could be completed  Consult- Patti Oliveros 6/21/1929, 80 y o  male MRN: [id number]    Unit/Bed#: MS [facility] Encounter: [id number]    Primary Care Provider: Cassie Barragan MD   Date and time admitted to hospital: 3/26/2019 12:36 PM      Consults    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  Assessment & Plan  This patient is been diagnosed as having dementia and has been on a cholinesterase inhibitor for several years now by the granddaughter's report  He is of cared for routinely at a supervisory level in the home 24/7  His exam is generally well preserved although he did have features of cognitive processing difficulties on his exam, see below  As noted above I suspect the episode that this patient had which triggered his hospitalization was related to his dementia and anxiety  His daughter who I spoke with on the phone and his granddaughter who is at the bedside had no questions at the conclusion of our visit  * Acute encephalopathy  Assessment & Plan  The episode of confusion that this patient experienced today was approximately 30-45 minutes total   Although the onset of the episode was unwitnessed, as the granddaughter was in the shower, I suspect that the patient woke from his 1st nap of the day, in the morning and not finding or seen any family members became somewhat anxious and confused  With the presence of both his daughter and granddaughter he was able to participate again in clear conversation  There was no aphasia, however there was by the granddaughter's report a short  Under 2-3 minutes where she reports the patient was worked up to the point of having word-finding difficulties  Given his known dementia, see below I suspect that this was not a vascular event but rather an emotionally triggered cognitive processing event related to his dementia  His exam remains nonfocal in all aspects    His CT scan demonstrates no new changes suggesting an infarct or a sequela from his fall 3 weeks ago  Because of his pacemaker he cannot have an MRI at this [place]  His return to normal already noted yesterday and continued throughout the evening and so far this morning is reassuring that he does not need an MRI  There was no suggestion of an epileptic event either by the report of the event or in his exam   I did suggest to the granddaughter who is present at the bedside that he follow up with his PCP in approximately 1 week or so and that he could make an appointment with 1 of our neurologists at our [address] office in the upcoming month as follow-up  At this time there is no further neurologic workup necessary  Fall  Assessment & Plan  His fall was 3 weeks ago  His daughter reports that it was in the context of possibly being lightheaded  They are generally with him 24/7 to be available to supervise  He had a good recovery from that fall his CT scan demonstrates no concern for a residual bleed his exam is nonfocal see above  Continued monitoring at home with in-home or outpatient therapy would certainly be appropriate  Essential hypertension  Assessment & Plan  His family monitors his blood pressure and they report that he has gradually of been declining from elevated pressures to continually normal and occasionally low blood pressures  She reports that his blood pressure yesterday upon awakening was in the low 1'teens  Although they did not check his blood pressure at the time of this event they do report that he was somewhat pale but recovered quickly  I do not see that he needs orthostatics at this time  History of Coumadin therapy  Assessment & Plan  The patient is therapeutic on today's visit  He is monitored at home with the oversight by his cardiologist's office to [facility]   His family is rather diligent and includes 1 or 2 healthcare workers in over seen his Coumadin therapy    We would not suggest any changes at this time  Elevated serum creatinine  Assessment & Plan  Mild elevation with a return to normal after hydration upon admission  I do not believe that the mild elevation that he had would have been enough to trigger a confusional episode  HPI: Kayla Belcher is a right handed  80 y o  male who     ROS: 12 system cued query:         Historical Information     Past Medical History:   Diagnosis Date    Arthritis     Cardiac disorder     Disease of thyroid gland     Hyperlipidemia     Hypertension     Osteomyelitis of leg (HCC)     Osteomyelitis of sacrum (HCC)     [facility] [address] [facility]     Thyroid disorder      Past Surgical History:   Procedure Laterality Date    CARDIAC PACEMAKER PLACEMENT      HIP SURGERY      LEG SURGERY      incision below knee       Social History         Family History:   Family History   Problem Relation Age of Onset    Diabetes Mother     Hypertension Mother          Allergies   Allergen Reactions    Prednisone Confusion     Meds:all current active meds have been reviewed    Scheduled Meds:  Current Facility-Administered Medications:  aspirin 81 mg Oral Daily Rozann Aubrey, DO   carvedilol 12 5 mg Oral BID With Meals Rozann Aubrey, DO   donepezil 10 mg Oral HS RozaTiger Logisticstos, DO   levothyroxine 25 mcg Oral Early Morning Rozann Aubrey, DO   pravastatin 80 mg Oral Daily With AmVac, DO   warfarin 4 mg Oral HS Rozann Aubrey, DO     PRN Meds:       Physical Exam:   Objective   Vitals:Blood pressure 142/84, pulse 59, temperature 98 42 °F (36 9 °C), resp  rate 18, height 5' 7" (1 702 m), weight 104 kg (230 lb 6 1 oz), SpO2 95 %  ,Body mass index is 36 08 kg/m²  Patient was not examined        Dictation voice to text software has been used in the creation of this document  Please consider this in light of any contextual or grammatical errors

## 2019-03-27 NOTE — SPEECH THERAPY NOTE
Consult received, chart reviewed  Pt initially presented with AMS concerning for CVA, however, head CT is negative for acute infarct (unable to MRI due to pacemaker) and the pt's sx have now resolved  Per neurology consult, AMS is likely due to progression of pt's dementia  Pt passed dysphagia screening yesterday  D/w SANFORD Mo who reports that pt is tolerating aRegular/Thin Liquid diet without overt difficulty or s/sx aspiration and p/w no apparent deficits in speech, language, or swallowing  Pt likewise denies complaints related to speech, language, or swallowing  Granddaughter Laird Baumgarten (who assists with pt's caregiving) present and reports that pt is now at his baseline mental status  Briefly observed pt with PO intake of regular solids (potato chips) and thin liquids (ice water via straw) and swallow function appears WNL  Full ST eval is not warranted at this time  Please reconsult with any concerns

## 2019-03-27 NOTE — SOCIAL WORK
Cm met with patient and granddaughter Francisco Almonte at patient bedside  Cm informed Rey's mother Lisa Carlson is patient's poa  Cm alert and oriented reports residing with Lisa Carlson, never left alone and uses a rolling walker to ambulate  Patient is current with [facility], patient stated he would like to resume care with vna upon discharge  Cm informed [facility] uses [facility] with no co-payment barriers  Patient denies any MH/SA and reports no deficits  Daughter transports patient to MD appointments as recommended  Cm reviewed patient obs letter, patient provided granddaughter with consent to sign obs letter, copy provided to family for review  vna aware of patient discharge this day  CM reviewed discharge planning process including the following: identifying help at home, patient preference for discharge planning needs, pharmacy preference, and availability of treatment team to discuss questions or concerns patient and/or family may have regarding understanding medications and recognizing signs and symptoms once discharged  CM also encouraged patient to follow up with all recommended appointments after discharge  Patient advised of importance for patient and family to participate in managing patients medical well being  CM name and role reviewed  Discharge Checklist reviewed and CM will continue to monitor for progress toward discharge goals in nursing and provider rounds

## 2019-03-27 NOTE — NUTRITION
03/27/19 1124   Assessment   Timepoint Initial  (consult: stroke)   Labs   List Completed Labs   (3/27: alb 2 9, HDL 36; meds- warfarin, aricept, pravachol)   Feeding Route   PO Independent   Adequacy of Intake   Nutrition Modality PO  (2g Na)   Intake Meals %   Estimated calorie intake compared to estimated need pt stated he ate 100% of his pancakes this AM, has no complaints with appetite at this time   Nutrition Prognosis   Nutrition Concerns   (acute encephalopathy- now resolved: r/o stroke  +1 LE edema, skin intact)   Comorbid Concerns   (HTN)   Nutrition Considerations   (denied need for diet education, spoke with pt and family at bedside and they relay that stroke has been r/o  no stroke booklet at bedside at this time  pt had no nutrition related questions or concerns, )   PES Statement   Problem No nutrition diagnosis   Recommendations/Interventions   Summary Pt and family at bedside relay that stroke has been r/o  No notes in chart review have been updated at this time confirming no stroke dx  Pt has adequate intake at this time with no swallowing issues reported  Weight has been stable per pt  He denies need for nutrition education at this time, no stroke booklet has been provided at this time as well  If stroke is indeed present and nutrition education is warranted, please consult nutrition services   Continue with current diet order   Malnutrition/BMI Present No  (pt presents sitting in chair, very pleasant, round, obese abd  no physical s/s malnutrition present at this time)   Interventions Diet: continued as ordered   Nutrition Recommendations Continue diet as ordered   Nutrition Complexity Risk   Nutrition complexity level Low risk   Follow up date 04/03/19

## 2019-03-27 NOTE — ASSESSMENT & PLAN NOTE
His fall was 3 weeks ago  His daughter reports that it was in the context of possibly being lightheaded  They are generally with him 24/7 to be available to supervise  He had a good recovery from that fall his CT scan demonstrates no concern for a residual bleed his exam is nonfocal see above  Continued monitoring at home with in-home or outpatient therapy would certainly be appropriate

## 2019-03-27 NOTE — PLAN OF CARE
Problem: Potential for Falls  Goal: Patient will remain free of falls  Description  INTERVENTIONS:  - Assess patient frequently for physical needs  -  Identify cognitive and physical deficits and behaviors that affect risk of falls    -  Reeves fall precautions as indicated by assessment   - Educate patient/family on patient safety including physical limitations  - Instruct patient to call for assistance with activity based on assessment  - Modify environment to reduce risk of injury  - Consider OT/PT consult to assist with strengthening/mobility  Outcome: Progressing

## 2019-03-27 NOTE — ASSESSMENT & PLAN NOTE
This patient is been diagnosed as having dementia and has been on a cholinesterase inhibitor for several years now by the granddaughter's report  He is of cared for routinely at a supervisory level in the home 24/7  His exam is generally well preserved although he did have features of cognitive processing difficulties on his exam, see below  As noted above I suspect the episode that this patient had which triggered his hospitalization was related to his dementia and anxiety  His daughter who I spoke with on the phone and his granddaughter who is at the bedside had no questions at the conclusion of our visit

## 2019-04-02 LAB
ATRIAL RATE: 234 BPM
QRS AXIS: 100 DEGREES
QRSD INTERVAL: 134 MS
QT INTERVAL: 428 MS
QTC INTERVAL: 428 MS
T WAVE AXIS: -37 DEGREES
VENTRICULAR RATE: 60 BPM

## 2019-04-02 PROCEDURE — 93010 ELECTROCARDIOGRAM REPORT: CPT | Performed by: INTERNAL MEDICINE

## 2019-05-23 ENCOUNTER — HOSPITAL ENCOUNTER (EMERGENCY)
Facility: HOSPITAL | Age: 84
Discharge: HOME | End: 2019-05-23
Attending: EMERGENCY MEDICINE
Payer: MEDICARE

## 2019-05-23 ENCOUNTER — APPOINTMENT (EMERGENCY)
Dept: CT IMAGING | Facility: HOSPITAL | Age: 84
End: 2019-05-23
Payer: MEDICARE

## 2019-05-23 VITALS
BODY MASS INDEX: 38.05 KG/M2 | OXYGEN SATURATION: 84 % | DIASTOLIC BLOOD PRESSURE: 70 MMHG | HEIGHT: 66 IN | SYSTOLIC BLOOD PRESSURE: 153 MMHG | RESPIRATION RATE: 22 BRPM | WEIGHT: 236.77 LBS | TEMPERATURE: 98.2 F | HEART RATE: 129 BPM

## 2019-05-23 DIAGNOSIS — R26.2 AMBULATORY DYSFUNCTION: ICD-10-CM
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
## 2019-05-23 DIAGNOSIS — S51.811A SKIN TEAR OF RIGHT FOREARM WITHOUT COMPLICATION, INITIAL ENCOUNTER: ICD-10-CM

## 2019-05-23 LAB
ANION GAP SERPL CALCULATED.3IONS-SCNC: 3 MMOL/L (ref 4–13)
APTT PPP: 45 SECONDS (ref 26–38)
ATRIAL RATE: 62 BPM
BASOPHILS # BLD AUTO: 0.02 THOUSANDS/ΜL (ref 0–0.1)
BASOPHILS NFR BLD AUTO: 0 % (ref 0–1)
BUN SERPL-MCNC: 27 MG/DL (ref 5–25)
CALCIUM SERPL-MCNC: 8.8 MG/DL (ref 8.3–10.1)
CHLORIDE SERPL-SCNC: 107 MMOL/L (ref 100–108)
CO2 SERPL-SCNC: 32 MMOL/L (ref 21–32)
CREAT SERPL-MCNC: 1.15 MG/DL (ref 0.6–1.3)
EOSINOPHIL # BLD AUTO: 0.14 THOUSAND/ΜL (ref 0–0.61)
EOSINOPHIL NFR BLD AUTO: 1 % (ref 0–6)
ERYTHROCYTE [DISTWIDTH] IN BLOOD BY AUTOMATED COUNT: 13.2 % (ref 11.6–15.1)
GFR SERPL CREATININE-BSD FRML MDRD: 56 ML/MIN/1.73SQ M
GLUCOSE SERPL-MCNC: 103 MG/DL (ref 65–140)
HCT VFR BLD AUTO: 40 % (ref 36.5–49.3)
HGB BLD-MCNC: 12.8 G/DL (ref 12–17)
IMM GRANULOCYTES # BLD AUTO: 0.04 THOUSAND/UL (ref 0–0.2)
IMM GRANULOCYTES NFR BLD AUTO: 0 % (ref 0–2)
INR PPP: 2.62 (ref 0.86–1.17)
LYMPHOCYTES # BLD AUTO: 1.64 THOUSANDS/ΜL (ref 0.6–4.47)
LYMPHOCYTES NFR BLD AUTO: 16 % (ref 14–44)
MCH RBC QN AUTO: 31 PG (ref 26.8–34.3)
MCHC RBC AUTO-ENTMCNC: 32 G/DL (ref 31.4–37.4)
MCV RBC AUTO: 97 FL (ref 82–98)
MONOCYTES # BLD AUTO: 0.9 THOUSAND/ΜL (ref 0.17–1.22)
MONOCYTES NFR BLD AUTO: 9 % (ref 4–12)
NEUTROPHILS # BLD AUTO: 7.64 THOUSANDS/ΜL (ref 1.85–7.62)
NEUTS SEG NFR BLD AUTO: 74 % (ref 43–75)
NRBC BLD AUTO-RTO: 0 /100 WBCS
PLATELET # BLD AUTO: 195 THOUSANDS/UL (ref 149–390)
PMV BLD AUTO: 10.5 FL (ref 8.9–12.7)
POTASSIUM SERPL-SCNC: 4.8 MMOL/L (ref 3.5–5.3)
PROTHROMBIN TIME: 27.7 SECONDS (ref 11.8–14.2)
QRS AXIS: 79 DEGREES
QRSD INTERVAL: 168 MS
QT INTERVAL: 462 MS
QTC INTERVAL: 468 MS
RBC # BLD AUTO: 4.13 MILLION/UL (ref 3.88–5.62)
SODIUM SERPL-SCNC: 142 MMOL/L (ref 136–145)
T WAVE AXIS: -64 DEGREES
VENTRICULAR RATE: 62 BPM
WBC # BLD AUTO: 10.38 THOUSAND/UL (ref 4.31–10.16)

## 2019-05-23 PROCEDURE — 36415 COLL VENOUS BLD VENIPUNCTURE: CPT | Performed by: NURSE PRACTITIONER

## 2019-05-23 PROCEDURE — 93010 ELECTROCARDIOGRAM REPORT: CPT | Performed by: INTERNAL MEDICINE

## 2019-05-23 PROCEDURE — 99285 EMERGENCY DEPT VISIT HI MDM: CPT

## 2019-05-23 PROCEDURE — 85730 THROMBOPLASTIN TIME PARTIAL: CPT | Performed by: NURSE PRACTITIONER

## 2019-05-23 PROCEDURE — 99283 EMERGENCY DEPT VISIT LOW MDM: CPT | Performed by: EMERGENCY MEDICINE

## 2019-05-23 PROCEDURE — 85610 PROTHROMBIN TIME: CPT | Performed by: NURSE PRACTITIONER

## 2019-05-23 PROCEDURE — 70450 CT HEAD/BRAIN W/O DYE: CPT

## 2019-05-23 PROCEDURE — 93005 ELECTROCARDIOGRAM TRACING: CPT

## 2019-05-23 PROCEDURE — 80048 BASIC METABOLIC PNL TOTAL CA: CPT | Performed by: NURSE PRACTITIONER

## 2019-05-23 PROCEDURE — 85025 COMPLETE CBC W/AUTO DIFF WBC: CPT | Performed by: NURSE PRACTITIONER

## 2019-05-23 RX ORDER — WARFARIN SODIUM 4 MG/1
4 TABLET ORAL DAILY
COMMUNITY
Start: 2019-04-15 | End: 2019-05-23 | Stop reason: SDUPTHER

## 2019-05-23 RX ORDER — ESCITALOPRAM OXALATE 5 MG/1
5 TABLET ORAL EVERY MORNING
COMMUNITY
End: 2020-12-13

## 2019-05-23 RX ORDER — TRAMADOL HYDROCHLORIDE 50 MG/1
50 TABLET ORAL EVERY 6 HOURS PRN
COMMUNITY

## 2019-05-23 RX ORDER — BUMETANIDE 0.5 MG/1
0.5 TABLET ORAL DAILY
COMMUNITY
Start: 2019-02-02

## 2019-05-23 RX ORDER — CARVEDILOL 12.5 MG/1
12.5 TABLET ORAL 3 TIMES DAILY
COMMUNITY
Start: 2019-05-20

## 2019-05-23 RX ORDER — SIMVASTATIN 40 MG
40 TABLET ORAL DAILY
COMMUNITY
Start: 2018-12-03

## 2019-05-23 RX ORDER — SPIRONOLACTONE 25 MG/1
12.5 TABLET ORAL DAILY
COMMUNITY
Start: 2018-10-24

## 2019-05-23 RX ADMIN — Medication 2 APPLICATION: at 09:03

## 2019-05-24 ENCOUNTER — HOSPITAL ENCOUNTER (EMERGENCY)
Facility: HOSPITAL | Age: 84
Discharge: HOME | End: 2019-05-24
Attending: EMERGENCY MEDICINE | Admitting: EMERGENCY MEDICINE
Payer: MEDICARE

## 2019-05-24 ENCOUNTER — APPOINTMENT (EMERGENCY)
Dept: CT IMAGING | Facility: HOSPITAL | Age: 84
End: 2019-05-24
Payer: MEDICARE

## 2019-05-24 ENCOUNTER — APPOINTMENT (EMERGENCY)
Dept: RADIOLOGY | Facility: HOSPITAL | Age: 84
End: 2019-05-24
Payer: MEDICARE

## 2019-05-24 VITALS
TEMPERATURE: 98 F | OXYGEN SATURATION: 99 % | RESPIRATION RATE: 18 BRPM | HEART RATE: 65 BPM | BODY MASS INDEX: 38.07 KG/M2 | WEIGHT: 235.89 LBS | SYSTOLIC BLOOD PRESSURE: 147 MMHG | DIASTOLIC BLOOD PRESSURE: 67 MMHG

## 2019-05-24 DIAGNOSIS — R26.2 AMBULATORY DYSFUNCTION: Primary | ICD-10-CM

## 2019-05-24 LAB
ALBUMIN SERPL BCP-MCNC: 3.3 G/DL (ref 3.5–5)
ALP SERPL-CCNC: 80 U/L (ref 46–116)
ALT SERPL W P-5'-P-CCNC: 14 U/L (ref 12–78)
ANION GAP SERPL CALCULATED.3IONS-SCNC: 7 MMOL/L (ref 4–13)
AST SERPL W P-5'-P-CCNC: 16 U/L (ref 5–45)
BASOPHILS # BLD AUTO: 0.01 THOUSANDS/ΜL (ref 0–0.1)
BASOPHILS NFR BLD AUTO: 0 % (ref 0–1)
BILIRUB SERPL-MCNC: 0.5 MG/DL (ref 0.2–1)
BILIRUB UR QL STRIP: NEGATIVE
BUN SERPL-MCNC: 32 MG/DL (ref 5–25)
CALCIUM SERPL-MCNC: 8.7 MG/DL (ref 8.3–10.1)
CHLORIDE SERPL-SCNC: 106 MMOL/L (ref 100–108)
CLARITY UR: CLEAR
CO2 SERPL-SCNC: 30 MMOL/L (ref 21–32)
COLOR UR: YELLOW
CREAT SERPL-MCNC: 1.47 MG/DL (ref 0.6–1.3)
EOSINOPHIL # BLD AUTO: 0.39 THOUSAND/ΜL (ref 0–0.61)
EOSINOPHIL NFR BLD AUTO: 5 % (ref 0–6)
ERYTHROCYTE [DISTWIDTH] IN BLOOD BY AUTOMATED COUNT: 13.3 % (ref 11.6–15.1)
GFR SERPL CREATININE-BSD FRML MDRD: 42 ML/MIN/1.73SQ M
GLUCOSE SERPL-MCNC: 107 MG/DL (ref 65–140)
GLUCOSE UR STRIP-MCNC: NEGATIVE MG/DL
HCT VFR BLD AUTO: 38.2 % (ref 36.5–49.3)
HGB BLD-MCNC: 12.1 G/DL (ref 12–17)
HGB UR QL STRIP.AUTO: NEGATIVE
IMM GRANULOCYTES # BLD AUTO: 0.03 THOUSAND/UL (ref 0–0.2)
IMM GRANULOCYTES NFR BLD AUTO: 0 % (ref 0–2)
INR PPP: 3.19 (ref 0.86–1.17)
KETONES UR STRIP-MCNC: NEGATIVE MG/DL
LEUKOCYTE ESTERASE UR QL STRIP: NEGATIVE
LYMPHOCYTES # BLD AUTO: 0.8 THOUSANDS/ΜL (ref 0.6–4.47)
LYMPHOCYTES NFR BLD AUTO: 9 % (ref 14–44)
MCH RBC QN AUTO: 31.1 PG (ref 26.8–34.3)
MCHC RBC AUTO-ENTMCNC: 31.7 G/DL (ref 31.4–37.4)
MCV RBC AUTO: 98 FL (ref 82–98)
MONOCYTES # BLD AUTO: 0.86 THOUSAND/ΜL (ref 0.17–1.22)
MONOCYTES NFR BLD AUTO: 10 % (ref 4–12)
NEUTROPHILS # BLD AUTO: 6.38 THOUSANDS/ΜL (ref 1.85–7.62)
NEUTS SEG NFR BLD AUTO: 76 % (ref 43–75)
NITRITE UR QL STRIP: NEGATIVE
NRBC BLD AUTO-RTO: 0 /100 WBCS
PH UR STRIP.AUTO: 5.5 [PH]
PLATELET # BLD AUTO: 161 THOUSANDS/UL (ref 149–390)
PMV BLD AUTO: 11 FL (ref 8.9–12.7)
POTASSIUM SERPL-SCNC: 4.2 MMOL/L (ref 3.5–5.3)
PROT SERPL-MCNC: 6.7 G/DL (ref 6.4–8.2)
PROT UR STRIP-MCNC: NEGATIVE MG/DL
PROTHROMBIN TIME: 32.2 SECONDS (ref 11.8–14.2)
RBC # BLD AUTO: 3.89 MILLION/UL (ref 3.88–5.62)
SODIUM SERPL-SCNC: 143 MMOL/L (ref 136–145)
SP GR UR STRIP.AUTO: 1.02 (ref 1–1.03)
TROPONIN I SERPL-MCNC: <0.02 NG/ML
UROBILINOGEN UR QL STRIP.AUTO: 0.2 E.U./DL
WBC # BLD AUTO: 8.47 THOUSAND/UL (ref 4.31–10.16)

## 2019-05-24 PROCEDURE — 72125 CT NECK SPINE W/O DYE: CPT

## 2019-05-24 PROCEDURE — 70450 CT HEAD/BRAIN W/O DYE: CPT

## 2019-05-24 PROCEDURE — 93005 ELECTROCARDIOGRAM TRACING: CPT

## 2019-05-24 PROCEDURE — 85610 PROTHROMBIN TIME: CPT | Performed by: EMERGENCY MEDICINE

## 2019-05-24 PROCEDURE — 80053 COMPREHEN METABOLIC PANEL: CPT | Performed by: EMERGENCY MEDICINE

## 2019-05-24 PROCEDURE — 96360 HYDRATION IV INFUSION INIT: CPT

## 2019-05-24 PROCEDURE — 99284 EMERGENCY DEPT VISIT MOD MDM: CPT | Performed by: EMERGENCY MEDICINE

## 2019-05-24 PROCEDURE — 71046 X-RAY EXAM CHEST 2 VIEWS: CPT

## 2019-05-24 PROCEDURE — 85025 COMPLETE CBC W/AUTO DIFF WBC: CPT | Performed by: EMERGENCY MEDICINE

## 2019-05-24 PROCEDURE — 96361 HYDRATE IV INFUSION ADD-ON: CPT

## 2019-05-24 PROCEDURE — 84484 ASSAY OF TROPONIN QUANT: CPT | Performed by: EMERGENCY MEDICINE

## 2019-05-24 PROCEDURE — 81003 URINALYSIS AUTO W/O SCOPE: CPT | Performed by: EMERGENCY MEDICINE

## 2019-05-24 PROCEDURE — 99285 EMERGENCY DEPT VISIT HI MDM: CPT

## 2019-05-24 PROCEDURE — 73030 X-RAY EXAM OF SHOULDER: CPT

## 2019-05-24 PROCEDURE — 36415 COLL VENOUS BLD VENIPUNCTURE: CPT | Performed by: EMERGENCY MEDICINE

## 2019-05-24 RX ADMIN — SODIUM CHLORIDE 500 ML: 0.9 INJECTION, SOLUTION INTRAVENOUS at 18:07

## 2019-05-25 LAB
P AXIS: 62 DEGREES
QRS AXIS: 39 DEGREES
QRSD INTERVAL: 118 MS
QT INTERVAL: 402 MS
QTC INTERVAL: 408 MS
T WAVE AXIS: 73 DEGREES
VENTRICULAR RATE: 62 BPM

## 2019-05-25 PROCEDURE — 93010 ELECTROCARDIOGRAM REPORT: CPT | Performed by: INTERNAL MEDICINE

## 2019-09-15 ENCOUNTER — HOSPITAL ENCOUNTER (EMERGENCY)
Facility: HOSPITAL | Age: 84
Discharge: HOME | End: 2019-09-15
Attending: EMERGENCY MEDICINE
Payer: MEDICARE

## 2019-09-15 ENCOUNTER — APPOINTMENT (EMERGENCY)
Dept: RADIOLOGY | Facility: HOSPITAL | Age: 84
End: 2019-09-15
Payer: MEDICARE

## 2019-09-15 VITALS
TEMPERATURE: 97.5 F | SYSTOLIC BLOOD PRESSURE: 133 MMHG | BODY MASS INDEX: 37.91 KG/M2 | HEIGHT: 66 IN | HEART RATE: 61 BPM | WEIGHT: 235.89 LBS | DIASTOLIC BLOOD PRESSURE: 70 MMHG | RESPIRATION RATE: 18 BRPM | OXYGEN SATURATION: 99 %

## 2019-09-15 DIAGNOSIS — M25.561 RIGHT KNEE PAIN: Primary | ICD-10-CM

## 2019-09-15 DIAGNOSIS — M17.11 OSTEOARTHRITIS OF RIGHT KNEE: ICD-10-CM

## 2019-09-15 LAB
ANION GAP SERPL CALCULATED.3IONS-SCNC: 3 MMOL/L (ref 4–13)
APTT PPP: 27 SECONDS (ref 23–37)
BASOPHILS # BLD AUTO: 0 THOUSANDS/ΜL (ref 0–0.1)
BASOPHILS NFR BLD AUTO: 0 % (ref 0–1)
BUN SERPL-MCNC: 20 MG/DL (ref 5–25)
CALCIUM SERPL-MCNC: 8.4 MG/DL (ref 8.3–10.1)
CHLORIDE SERPL-SCNC: 104 MMOL/L (ref 100–108)
CO2 SERPL-SCNC: 32 MMOL/L (ref 21–32)
CREAT SERPL-MCNC: 1.03 MG/DL (ref 0.6–1.3)
EOSINOPHIL # BLD AUTO: 0.15 THOUSAND/ΜL (ref 0–0.61)
EOSINOPHIL NFR BLD AUTO: 2 % (ref 0–6)
ERYTHROCYTE [DISTWIDTH] IN BLOOD BY AUTOMATED COUNT: 12.9 % (ref 11.6–15.1)
GFR SERPL CREATININE-BSD FRML MDRD: 64 ML/MIN/1.73SQ M
GLUCOSE SERPL-MCNC: 95 MG/DL (ref 65–140)
HCT VFR BLD AUTO: 39.1 % (ref 36.5–49.3)
HGB BLD-MCNC: 12.4 G/DL (ref 12–17)
IMM GRANULOCYTES # BLD AUTO: 0.04 THOUSAND/UL (ref 0–0.2)
IMM GRANULOCYTES NFR BLD AUTO: 1 % (ref 0–2)
INR PPP: 2.29 (ref 0.84–1.19)
LYMPHOCYTES # BLD AUTO: 1.48 THOUSANDS/ΜL (ref 0.6–4.47)
LYMPHOCYTES NFR BLD AUTO: 20 % (ref 14–44)
MCH RBC QN AUTO: 30.9 PG (ref 26.8–34.3)
MCHC RBC AUTO-ENTMCNC: 31.7 G/DL (ref 31.4–37.4)
MCV RBC AUTO: 98 FL (ref 82–98)
MONOCYTES # BLD AUTO: 1.02 THOUSAND/ΜL (ref 0.17–1.22)
MONOCYTES NFR BLD AUTO: 14 % (ref 4–12)
NEUTROPHILS # BLD AUTO: 4.81 THOUSANDS/ΜL (ref 1.85–7.62)
NEUTS SEG NFR BLD AUTO: 63 % (ref 43–75)
NRBC BLD AUTO-RTO: 0 /100 WBCS
PLATELET # BLD AUTO: 199 THOUSANDS/UL (ref 149–390)
PMV BLD AUTO: 10.6 FL (ref 8.9–12.7)
POTASSIUM SERPL-SCNC: 4.4 MMOL/L (ref 3.5–5.3)
PROTHROMBIN TIME: 25.5 SECONDS (ref 11.6–14.5)
RBC # BLD AUTO: 4.01 MILLION/UL (ref 3.88–5.62)
SODIUM SERPL-SCNC: 139 MMOL/L (ref 136–145)
WBC # BLD AUTO: 7.5 THOUSAND/UL (ref 4.31–10.16)

## 2019-09-15 PROCEDURE — 85730 THROMBOPLASTIN TIME PARTIAL: CPT | Performed by: PHYSICIAN ASSISTANT

## 2019-09-15 PROCEDURE — 99283 EMERGENCY DEPT VISIT LOW MDM: CPT

## 2019-09-15 PROCEDURE — 80048 BASIC METABOLIC PNL TOTAL CA: CPT | Performed by: PHYSICIAN ASSISTANT

## 2019-09-15 PROCEDURE — 99285 EMERGENCY DEPT VISIT HI MDM: CPT | Performed by: PHYSICIAN ASSISTANT

## 2019-09-15 PROCEDURE — 85610 PROTHROMBIN TIME: CPT | Performed by: PHYSICIAN ASSISTANT

## 2019-09-15 PROCEDURE — 73564 X-RAY EXAM KNEE 4 OR MORE: CPT

## 2019-09-15 PROCEDURE — 73502 X-RAY EXAM HIP UNI 2-3 VIEWS: CPT

## 2019-09-15 PROCEDURE — 36415 COLL VENOUS BLD VENIPUNCTURE: CPT | Performed by: PHYSICIAN ASSISTANT

## 2019-09-15 PROCEDURE — 85025 COMPLETE CBC W/AUTO DIFF WBC: CPT | Performed by: PHYSICIAN ASSISTANT

## 2019-09-15 RX ORDER — LIDOCAINE 50 MG/G
OINTMENT TOPICAL 3 TIMES DAILY PRN
Qty: 50 G | Refills: 0 | Status: SHIPPED | OUTPATIENT
Start: 2019-09-15 | End: 2019-10-15

## 2019-09-15 RX ORDER — METHOCARBAMOL 750 MG/1
750 TABLET, FILM COATED ORAL 3 TIMES DAILY PRN
Qty: 20 TABLET | Refills: 0 | Status: SHIPPED | OUTPATIENT
Start: 2019-09-15 | End: 2019-09-20

## 2019-09-15 RX ORDER — OXYCODONE HYDROCHLORIDE 5 MG/1
5 TABLET ORAL ONCE
Status: DISCONTINUED | OUTPATIENT
Start: 2019-09-15 | End: 2019-09-15

## 2019-09-15 RX ORDER — METHOCARBAMOL 500 MG/1
500 TABLET, FILM COATED ORAL ONCE
Status: COMPLETED | OUTPATIENT
Start: 2019-09-15 | End: 2019-09-15

## 2019-09-15 RX ORDER — QUETIAPINE FUMARATE 50 MG/1
150 TABLET, FILM COATED ORAL
COMMUNITY

## 2019-09-15 RX ADMIN — METHOCARBAMOL TABLETS 500 MG: 500 TABLET, COATED ORAL at 17:03

## 2019-09-15 NOTE — ED NOTES
Patient ambulated with this RN using a walker from the bed to wheelchair     Lukasz Mark RN  09/15/19 [id number]

## 2019-09-15 NOTE — ED PROVIDER NOTES
History  Chief Complaint   Patient presents with    Leg Pain     right leg pain for about 2 weeks getting worse since Friday  denies injury  denies hx of blood clots  pt on coumadin  80year old male with past medical history significant for hypertension, chronic anticoagulation on coumadin, dementia and elevated creatinine presents to ed with chief complaint of increasing right hip and knee pain resulting in decreased ability to walk  Patient walks with a walker at baseline,  He lives at home with daily caregiver present  Son provides additional details of HPI  He reports patient has been reporting increasing pain in the right leg and knee for the past 2 weeks  Patient has had chronic pain in the hip and knee on the right for which he has taken tramadol in the past   Son reports that despite taking tramadol, pain has been increasing and limiting ability to ambulate  Location is reported as the right hip, leg and knee  Quality is reported as sharp pain  Severity is reported as moderate to severe listed as 8/10 on the pain scale  Associated symptoms: positive for swelling to right lower extremity, positive for right hip and knee pain  Denies paralysis or paraesthesias to right lower extremity  Denies chest pain or sob  Modifiers; walking and weight bearing exacerbates pain  Tramadol gives minimal relief - less effective recently  Reviewed past visits via [facility], patient last seen in ed on 5/24/2019 for evaluation of ambulatory dysfunction  Son denies any recent fall injury or trauma  History provided by:  Patient   used: No        Prior to Admission Medications   Prescriptions Last Dose Informant Patient Reported? Taking?    FOLIC ACID PO  Care Giver Yes Yes   Sig: Take 1 capsule by mouth daily   QUEtiapine (SEROquel) 25 mg tablet   Yes Yes   Sig: Take 25 mg by mouth daily at bedtime   bumetanide (BUMEX) 0 5 MG tablet   Yes Yes   Sig: Take 0 5 mg by mouth daily   carvedilol (COREG) 12 5 mg tablet   Yes Yes   Sig: Take 12 5 mg by mouth 3 (three) times a day    diclofenac sodium (VOLTAREN) 1 %  Care Giver No No   Sig: Apply 2 g topically 4 (four) times a day for 7 days   donepezil (ARICEPT) 10 mg tablet  Care Giver Yes Yes   Sig: Take 10 mg by mouth daily at bedtime   escitalopram (LEXAPRO) 5 mg tablet Not Taking at Unknown time  Yes No   Sig: Take 5 mg by mouth every morning   levothyroxine 25 mcg tablet  Care Giver Yes Yes   Sig: Take 25 mcg by mouth daily in the early morning   simvastatin (ZOCOR) 40 mg tablet   Yes Yes   Sig: Take 40 mg by mouth daily   spironolactone (ALDACTONE) 25 mg tablet   Yes Yes   Sig: Take 12 5 mg by mouth daily   traMADol (ULTRAM) 50 mg tablet   Yes Yes   Sig: Take 50 mg by mouth every 6 (six) hours as needed for moderate pain   warfarin (COUMADIN) 4 mg tablet  Care Giver Yes Yes   Sig: Take 4 mg by mouth daily at bedtime       Facility-Administered Medications: None       Past Medical History:   Diagnosis Date    Arthritis     Cardiac disorder     Disease of thyroid gland     Hyperlipidemia     Hypertension     Osteomyelitis of leg (HCC)     Osteomyelitis of sacrum (HCC)     Stroke ([facility] [address] )     Thyroid disorder        Past Surgical History:   Procedure Laterality Date    CARDIAC PACEMAKER PLACEMENT      HIP SURGERY      LEG SURGERY      incision below knee       Family History   Problem Relation Age of Onset    Diabetes Mother     Hypertension Mother      I have reviewed and agree with the history as documented  Social History     Tobacco Use    Smoking status: Never Smoker    Smokeless tobacco: Never Used    Tobacco comment: former smoker per [employer]    Substance Use Topics    Alcohol use: Never     Frequency: Never    Drug use: No        Review of Systems   Constitutional: Negative for activity change, appetite change, chills, diaphoresis, fatigue, fever and unexpected weight change     HENT: Negative for congestion, dental problem, drooling, ear discharge, ear pain, facial swelling, hearing loss, mouth sores, nosebleeds, postnasal drip, rhinorrhea, sinus pressure, sinus pain, sneezing, sore throat, tinnitus, trouble swallowing and voice change  Eyes: Negative for photophobia, pain, discharge, redness, itching and visual disturbance  Respiratory: Negative for cough, chest tightness, shortness of breath, wheezing and stridor  Cardiovascular: Positive for leg swelling  Negative for chest pain and palpitations  Gastrointestinal: Negative for abdominal distention, abdominal pain, anal bleeding, blood in stool, constipation, diarrhea, nausea, rectal pain and vomiting  Endocrine: Negative for cold intolerance, heat intolerance, polydipsia, polyphagia and polyuria  Genitourinary: Negative for decreased urine volume, difficulty urinating, dysuria, flank pain, frequency, hematuria and urgency  Musculoskeletal: Positive for arthralgias, gait problem and joint swelling  Negative for back pain, myalgias, neck pain and neck stiffness  Skin: Negative for color change, pallor, rash and wound  Allergic/Immunologic: Negative for environmental allergies, food allergies and immunocompromised state  Neurological: Negative for dizziness, tremors, seizures, syncope, facial asymmetry, speech difficulty, weakness, light-headedness, numbness and headaches  Hematological: Negative for adenopathy  Does not bruise/bleed easily  Psychiatric/Behavioral: Negative for agitation, confusion and hallucinations  The patient is not nervous/anxious  All other systems reviewed and are negative  Physical Exam  Physical Exam   Constitutional: He is oriented to person, place, and time  He appears well-developed and well-nourished  No distress     /70 (BP Location: Right arm)   Pulse 61   Temp 97 5 °F (36 4 °C) (Oral)   Resp 18   Ht 5' 6" (1 676 m)   Wt 107 kg (235 lb 14 3 oz)   SpO2 99%   BMI 38 07 kg/m²    HENT:   Head: Normocephalic and atraumatic  Right Ear: External ear normal    Left Ear: External ear normal    Nose: Nose normal    Mouth/Throat: Oropharynx is clear and moist  No oropharyngeal exudate  Eyes: Pupils are equal, round, and reactive to light  Conjunctivae and EOM are normal  Right eye exhibits no discharge  Left eye exhibits no discharge  No scleral icterus  Neck: Normal range of motion  Neck supple  No tracheal deviation present  No thyromegaly present  Cardiovascular: Normal rate, regular rhythm and intact distal pulses  Pulmonary/Chest: Effort normal and breath sounds normal  No stridor  No respiratory distress  He has no wheezes  He has no rales  He exhibits no tenderness  Abdominal: Soft  Bowel sounds are normal  He exhibits no distension and no mass  There is no tenderness  There is no rebound and no guarding  Musculoskeletal: He exhibits edema and tenderness  He exhibits no deformity  Patient with 2 +bilateral pitting edema  No joint erythema  There is tenderness to right lateral hip as well as all surfaces of right thigh and lateral surface of right knee  ROM knee limited secondary to pain and swelling  No palpable cords  Distal neurovascular tendon intact to bilateral lower extremities  Lymphadenopathy:     He has no cervical adenopathy  Neurological: He is alert and oriented to person, place, and time  He displays normal reflexes  No cranial nerve deficit or sensory deficit  He exhibits normal muscle tone  Coordination normal    Skin: Skin is warm and dry  Capillary refill takes less than 2 seconds  No rash noted  He is not diaphoretic  No erythema  No pallor  Psychiatric: He has a normal mood and affect  His behavior is normal  Judgment and thought content normal    Nursing note and vitals reviewed        Vital Signs  ED Triage Vitals [09/15/19 1344]   Temperature Pulse Respirations Blood Pressure SpO2   97 5 °F (36 4 °C) 61 18 133/70 99 %      Temp Source Heart Rate Source Patient Position - Orthostatic VS BP Location FiO2 (%)   Oral Monitor Sitting Right arm --      Pain Score       8           Vitals:    09/15/19 1344   BP: 133/70   Pulse: 61   Patient Position - Orthostatic VS: Sitting         Visual Acuity      ED Medications  Medications   methocarbamol (ROBAXIN) tablet 500 mg (500 mg Oral Given 9/15/19 1703)       Diagnostic Studies  Results Reviewed     Procedure Component Value Units Date/Time    Protime-INR [[id number]]  (Abnormal) Collected:  09/15/19 1539    Lab Status:  Final result Specimen:  Blood from Arm, Left Updated:  09/15/19 1631     Protime 25 5 seconds      INR 2 29    APTT [[id number]]  (Normal) Collected:  09/15/19 1539    Lab Status:  Final result Specimen:  Blood from Arm, Left Updated:  09/15/19 1631     PTT 27 seconds     Basic metabolic panel [[id number]]  (Abnormal) Collected:  09/15/19 1539    Lab Status:  Final result Specimen:  Blood from Arm, Left Updated:  09/15/19 1557     Sodium 139 mmol/L      Potassium 4 4 mmol/L      Chloride 104 mmol/L      CO2 32 mmol/L      ANION GAP 3 mmol/L      BUN 20 mg/dL      Creatinine 1 03 mg/dL      Glucose 95 mg/dL      Calcium 8 4 mg/dL      eGFR 64 ml/min/1 73sq m     Narrative:       [place] guidelines for Chronic Kidney Disease (CKD):     Stage 1 with normal or high GFR (GFR > 90 mL/min/1 73 square meters)    Stage 2 Mild CKD (GFR = 60-89 mL/min/1 73 square meters)    Stage 3A Moderate CKD (GFR = 45-59 mL/min/1 73 square meters)    Stage 3B Moderate CKD (GFR = 30-44 mL/min/1 73 square meters)    Stage 4 Severe CKD (GFR = 15-29 mL/min/1 73 square meters)    Stage 5 End Stage CKD (GFR <15 mL/min/1 73 square meters)  Note: GFR calculation is accurate only with a steady state creatinine    CBC and differential [[id number]]  (Abnormal) Collected:  09/15/19 1539    Lab Status:  Final result Specimen:  Blood from Arm, Left Updated:  09/15/19 1544     WBC 7 50 Thousand/uL      RBC 4 01 Million/uL Hemoglobin 12 4 g/dL      Hematocrit 39 1 %      MCV 98 fL      MCH 30 9 pg      MCHC 31 7 g/dL      RDW 12 9 %      MPV 10 6 fL      Platelets 681 Thousands/uL      nRBC 0 /100 WBCs      Neutrophils Relative 63 %      Immat GRANS % 1 %      Lymphocytes Relative 20 %      Monocytes Relative 14 %      Eosinophils Relative 2 %      Basophils Relative 0 %      Neutrophils Absolute 4 81 Thousands/µL      Immature Grans Absolute 0 04 Thousand/uL      Lymphocytes Absolute 1 48 Thousands/µL      Monocytes Absolute 1 02 Thousand/µL      Eosinophils Absolute 0 15 Thousand/µL      Basophils Absolute 0 00 Thousands/µL                  XR hip/pelv 2-3 vws right    (Results Pending)   XR knee 4+ views Right injury    (Results Pending)              Procedures  Procedures       ED Course                               MDM  Number of Diagnoses or Management Options  Osteoarthritis of right knee: new and requires workup  Right knee pain: new and requires workup  Diagnosis management comments: ddx includes but is not limited to fracture, contusion, sprain, strain, nerve injury, tendon injury, vascular injury, tendinitis, bursitis, dislocation, edema, venous insufficiency, PAD, PVD, DVT, OA , RA, plan check xray of hip and knee,  Add labs to evaluated for anemia, renal failure and therapeutic inr  Lab results reviewed  INR is therapeutic at 2 2  CBC demonstrates normal white blood cell count of 7 5, hemoglobin of 12 4 and hematocrit 39 1 are normal   No anemia  Basic metabolic panel was reviewed, potassium normal at 4 4, BUN 20 and creatinine of 1 0 are normal   No renal failure  X-ray images right hip independently visualized and interpreted by me demonstrate no acute fracture dislocation  X-ray images of the right knee independently visualized and interpreted by me  Demonstrates significant degenerative arthritis  No acute displaced fracture noted      I reviewed x-ray and lab results with the patient and his son at bedside  I also spoke with his daughter via telephone regarding these results  We discussed treatment plan including use of additional adjunctive pain management modalities including topical lidocaine, Voltaren gel and will add Robaxin  I offered narcotic analgesics however family would not like to start patient on narcotics given possible side effects  I discussed with him the possible limitations and limited response from the adjunct given degree of degenerative joint disease  They verbalized understanding and agreement with same  There is able to have physical therapy come to the house 1st additional treatment  Follow up with primary care physician Orthopedics in 2-3 days instructed  Patient and son and daughter via telephone are comfortable with plan is discussed  Reviewed reasons to return to ed  Patient verbalized understanding of diagnosis and agreement with discharge plan of care as well as understanding of reasons to return to ed            Amount and/or Complexity of Data Reviewed  Clinical lab tests: ordered and reviewed  Tests in the radiology section of CPT®: ordered and reviewed  Discussion of test results with the performing providers: yes  Obtain history from someone other than the patient: yes (son)  Review and summarize past medical records: yes  Independent visualization of images, tracings, or specimens: yes    Patient Progress  Patient progress: stable      Disposition  Final diagnoses:   Right knee pain   Osteoarthritis of right knee     Time reflects when diagnosis was documented in both MDM as applicable and the Disposition within this note     Time User Action Codes Description Comment    9/15/2019  5:00 PM Saint Obey Add [M25 561] Right knee pain     9/15/2019  5:00 PM Saint Obey Add [M17 11] Osteoarthritis of right knee       ED Disposition     ED Disposition Condition Date/Time Comment    Discharge Stable Sun Sep 15, 2019  5:00 PM Fuentes Maddox discharge to home/self care             Follow-up Information     Follow up With Specialties Details Why Contact Info Additional Information    Christopher Monge MD Family Medicine Call in 1 day for further evaluation of symptoms [address] [facility] Emergency Medicine Go to  If symptoms worsen [address] [facility] Klarissa Hansone [address] ED, [address] [facility], [address], MD Orthopedic Surgery Call in 1 day for further evaluation of symptoms [address] [facility]  [address]  [place] [id number]  [phone]             Patient's Medications   Discharge Prescriptions    DICLOFENAC SODIUM (VOLTAREN) 1 %    Apply 2 g topically 3 (three) times a day as needed (knee pain/initial rx )       Start Date: 9/15/2019 End Date: 10/15/2019       Order Dose: 2 g       Quantity: 100 g    Refills: 0    LIDOCAINE (XYLOCAINE) 5 % OINTMENT    Apply topically 3 (three) times a day as needed for mild pain (knee pain/initial rx )       Start Date: 9/15/2019 End Date: 10/15/2019       Order Dose: --       Quantity: 50 g    Refills: 0    METHOCARBAMOL (ROBAXIN) 750 MG TABLET    Take 1 tablet (750 mg total) by mouth 3 (three) times a day as needed (knee pain/initial rx ) for up to 5 days       Start Date: 9/15/2019 End Date: 9/20/2019       Order Dose: 750 mg       Quantity: 20 tablet    Refills: 0     No discharge procedures on file      ED Provider  Electronically Signed by           Jacinto Montelongo PA-C  09/15/19 5536 YAMINI Miller PA-C  09/15/19 9736

## 2020-08-04 ENCOUNTER — APPOINTMENT (INPATIENT)
Dept: ULTRASOUND IMAGING | Facility: HOSPITAL | Age: 85
DRG: 554 | End: 2020-08-04
Payer: MEDICARE

## 2020-08-04 ENCOUNTER — HOSPITAL ENCOUNTER (INPATIENT)
Facility: HOSPITAL | Age: 85
LOS: 4 days | Discharge: HOME HEALTH | DRG: 554 | End: 2020-08-08
Attending: EMERGENCY MEDICINE | Admitting: INTERNAL MEDICINE
Payer: MEDICARE

## 2020-08-04 ENCOUNTER — APPOINTMENT (INPATIENT)
Dept: RADIOLOGY | Facility: HOSPITAL | Age: 85
DRG: 554 | End: 2020-08-04
Payer: MEDICARE

## 2020-08-04 ENCOUNTER — APPOINTMENT (EMERGENCY)
Dept: ULTRASOUND IMAGING | Facility: HOSPITAL | Age: 85
DRG: 554 | End: 2020-08-04
Payer: MEDICARE

## 2020-08-04 DIAGNOSIS — M11.261 PSEUDOGOUT OF RIGHT KNEE: ICD-10-CM

## 2020-08-04 DIAGNOSIS — L03.115 CELLULITIS OF RIGHT LEG: Primary | ICD-10-CM

## 2020-08-04 PROBLEM — L03.90 CELLULITIS: Status: ACTIVE | Noted: 2020-08-04

## 2020-08-04 PROBLEM — M17.11 PRIMARY OSTEOARTHRITIS OF RIGHT KNEE: Status: ACTIVE | Noted: 2017-08-22

## 2020-08-04 PROBLEM — I50.31 ACUTE DIASTOLIC CHF (CONGESTIVE HEART FAILURE) (HCC): Status: ACTIVE | Noted: 2018-02-01

## 2020-08-04 PROBLEM — I50.32 CHRONIC DIASTOLIC CONGESTIVE HEART FAILURE (HCC): Status: ACTIVE | Noted: 2018-02-01

## 2020-08-04 LAB
ALBUMIN SERPL BCP-MCNC: 3.2 G/DL (ref 3.5–5)
ALP SERPL-CCNC: 90 U/L (ref 46–116)
ALT SERPL W P-5'-P-CCNC: 23 U/L (ref 12–78)
ANION GAP SERPL CALCULATED.3IONS-SCNC: 5 MMOL/L (ref 4–13)
APTT PPP: 52 SECONDS (ref 23–37)
AST SERPL W P-5'-P-CCNC: 15 U/L (ref 5–45)
BASOPHILS # BLD AUTO: 0.01 THOUSANDS/ΜL (ref 0–0.1)
BASOPHILS NFR BLD AUTO: 0 % (ref 0–1)
BILIRUB SERPL-MCNC: 0.5 MG/DL (ref 0.2–1)
BUN SERPL-MCNC: 16 MG/DL (ref 5–25)
CALCIUM SERPL-MCNC: 8.5 MG/DL (ref 8.3–10.1)
CHLORIDE SERPL-SCNC: 107 MMOL/L (ref 100–108)
CO2 SERPL-SCNC: 34 MMOL/L (ref 21–32)
CREAT SERPL-MCNC: 1.09 MG/DL (ref 0.6–1.3)
EOSINOPHIL # BLD AUTO: 0.1 THOUSAND/ΜL (ref 0–0.61)
EOSINOPHIL NFR BLD AUTO: 1 % (ref 0–6)
ERYTHROCYTE [DISTWIDTH] IN BLOOD BY AUTOMATED COUNT: 12.9 % (ref 11.6–15.1)
GFR SERPL CREATININE-BSD FRML MDRD: 59 ML/MIN/1.73SQ M
GLUCOSE SERPL-MCNC: 73 MG/DL (ref 65–140)
HCT VFR BLD AUTO: 42.6 % (ref 36.5–49.3)
HGB BLD-MCNC: 13.3 G/DL (ref 12–17)
IMM GRANULOCYTES # BLD AUTO: 0.03 THOUSAND/UL (ref 0–0.2)
IMM GRANULOCYTES NFR BLD AUTO: 0 % (ref 0–2)
INR PPP: 2.68 (ref 0.84–1.19)
LACTATE SERPL-SCNC: 1.4 MMOL/L (ref 0.5–2)
LYMPHOCYTES # BLD AUTO: 1.42 THOUSANDS/ΜL (ref 0.6–4.47)
LYMPHOCYTES NFR BLD AUTO: 16 % (ref 14–44)
MCH RBC QN AUTO: 30.4 PG (ref 26.8–34.3)
MCHC RBC AUTO-ENTMCNC: 31.2 G/DL (ref 31.4–37.4)
MCV RBC AUTO: 97 FL (ref 82–98)
MONOCYTES # BLD AUTO: 1.31 THOUSAND/ΜL (ref 0.17–1.22)
MONOCYTES NFR BLD AUTO: 15 % (ref 4–12)
NEUTROPHILS # BLD AUTO: 6.03 THOUSANDS/ΜL (ref 1.85–7.62)
NEUTS SEG NFR BLD AUTO: 68 % (ref 43–75)
NRBC BLD AUTO-RTO: 0 /100 WBCS
PLATELET # BLD AUTO: 161 THOUSANDS/UL (ref 149–390)
PMV BLD AUTO: 10.8 FL (ref 8.9–12.7)
POTASSIUM SERPL-SCNC: 4 MMOL/L (ref 3.5–5.3)
PROT SERPL-MCNC: 6.8 G/DL (ref 6.4–8.2)
PROTHROMBIN TIME: 28.7 SECONDS (ref 11.6–14.5)
RBC # BLD AUTO: 4.38 MILLION/UL (ref 3.88–5.62)
SARS-COV-2 RNA RESP QL NAA+PROBE: NEGATIVE
SODIUM SERPL-SCNC: 146 MMOL/L (ref 136–145)
WBC # BLD AUTO: 8.9 THOUSAND/UL (ref 4.31–10.16)

## 2020-08-04 PROCEDURE — 1124F ACP DISCUSS-NO DSCNMKR DOCD: CPT | Performed by: PHYSICIAN ASSISTANT

## 2020-08-04 PROCEDURE — 93005 ELECTROCARDIOGRAM TRACING: CPT

## 2020-08-04 PROCEDURE — 73562 X-RAY EXAM OF KNEE 3: CPT

## 2020-08-04 PROCEDURE — 87635 SARS-COV-2 COVID-19 AMP PRB: CPT | Performed by: EMERGENCY MEDICINE

## 2020-08-04 PROCEDURE — 87040 BLOOD CULTURE FOR BACTERIA: CPT | Performed by: EMERGENCY MEDICINE

## 2020-08-04 PROCEDURE — 85730 THROMBOPLASTIN TIME PARTIAL: CPT | Performed by: EMERGENCY MEDICINE

## 2020-08-04 PROCEDURE — 80053 COMPREHEN METABOLIC PANEL: CPT | Performed by: EMERGENCY MEDICINE

## 2020-08-04 PROCEDURE — 36415 COLL VENOUS BLD VENIPUNCTURE: CPT | Performed by: EMERGENCY MEDICINE

## 2020-08-04 PROCEDURE — 96365 THER/PROPH/DIAG IV INF INIT: CPT

## 2020-08-04 PROCEDURE — 99223 1ST HOSP IP/OBS HIGH 75: CPT | Performed by: PHYSICIAN ASSISTANT

## 2020-08-04 PROCEDURE — 99284 EMERGENCY DEPT VISIT MOD MDM: CPT | Performed by: EMERGENCY MEDICINE

## 2020-08-04 PROCEDURE — 84145 PROCALCITONIN (PCT): CPT | Performed by: EMERGENCY MEDICINE

## 2020-08-04 PROCEDURE — 85610 PROTHROMBIN TIME: CPT | Performed by: EMERGENCY MEDICINE

## 2020-08-04 PROCEDURE — 85025 COMPLETE CBC W/AUTO DIFF WBC: CPT | Performed by: EMERGENCY MEDICINE

## 2020-08-04 PROCEDURE — 83605 ASSAY OF LACTIC ACID: CPT | Performed by: EMERGENCY MEDICINE

## 2020-08-04 PROCEDURE — 99285 EMERGENCY DEPT VISIT HI MDM: CPT

## 2020-08-04 RX ORDER — PRAVASTATIN SODIUM 80 MG/1
80 TABLET ORAL
Status: DISCONTINUED | OUTPATIENT
Start: 2020-08-05 | End: 2020-08-08 | Stop reason: HOSPADM

## 2020-08-04 RX ORDER — ONDANSETRON 2 MG/ML
4 INJECTION INTRAMUSCULAR; INTRAVENOUS EVERY 6 HOURS PRN
Status: DISCONTINUED | OUTPATIENT
Start: 2020-08-04 | End: 2020-08-08 | Stop reason: HOSPADM

## 2020-08-04 RX ORDER — CARVEDILOL 12.5 MG/1
12.5 TABLET ORAL 2 TIMES DAILY WITH MEALS
Status: DISCONTINUED | OUTPATIENT
Start: 2020-08-05 | End: 2020-08-08 | Stop reason: HOSPADM

## 2020-08-04 RX ORDER — DONEPEZIL HYDROCHLORIDE 5 MG/1
10 TABLET, FILM COATED ORAL
Status: DISCONTINUED | OUTPATIENT
Start: 2020-08-04 | End: 2020-08-08 | Stop reason: HOSPADM

## 2020-08-04 RX ORDER — CEFAZOLIN SODIUM 1 G/50ML
1000 SOLUTION INTRAVENOUS EVERY 8 HOURS
Status: DISCONTINUED | OUTPATIENT
Start: 2020-08-05 | End: 2020-08-07

## 2020-08-04 RX ORDER — LANOLIN ALCOHOL/MO/W.PET/CERES
9 CREAM (GRAM) TOPICAL
Status: DISCONTINUED | OUTPATIENT
Start: 2020-08-04 | End: 2020-08-08 | Stop reason: HOSPADM

## 2020-08-04 RX ORDER — LANOLIN ALCOHOL/MO/W.PET/CERES
400 CREAM (GRAM) TOPICAL DAILY
Status: DISCONTINUED | OUTPATIENT
Start: 2020-08-05 | End: 2020-08-08 | Stop reason: HOSPADM

## 2020-08-04 RX ORDER — DOCUSATE SODIUM 100 MG/1
100 CAPSULE, LIQUID FILLED ORAL 2 TIMES DAILY
Status: DISCONTINUED | OUTPATIENT
Start: 2020-08-04 | End: 2020-08-08 | Stop reason: HOSPADM

## 2020-08-04 RX ORDER — BUMETANIDE 1 MG/1
0.5 TABLET ORAL DAILY
Status: DISCONTINUED | OUTPATIENT
Start: 2020-08-05 | End: 2020-08-08 | Stop reason: HOSPADM

## 2020-08-04 RX ORDER — ACETAMINOPHEN 325 MG/1
650 TABLET ORAL EVERY 6 HOURS PRN
Status: DISCONTINUED | OUTPATIENT
Start: 2020-08-04 | End: 2020-08-08 | Stop reason: HOSPADM

## 2020-08-04 RX ORDER — WARFARIN SODIUM 4 MG/1
4 TABLET ORAL
Status: DISCONTINUED | OUTPATIENT
Start: 2020-08-04 | End: 2020-08-08 | Stop reason: HOSPADM

## 2020-08-04 RX ORDER — TRAMADOL HYDROCHLORIDE 50 MG/1
50 TABLET ORAL EVERY 6 HOURS PRN
Status: DISCONTINUED | OUTPATIENT
Start: 2020-08-04 | End: 2020-08-08 | Stop reason: HOSPADM

## 2020-08-04 RX ORDER — LEVOTHYROXINE SODIUM 0.03 MG/1
25 TABLET ORAL
Status: DISCONTINUED | OUTPATIENT
Start: 2020-08-05 | End: 2020-08-08 | Stop reason: HOSPADM

## 2020-08-04 RX ORDER — SPIRONOLACTONE 25 MG/1
12.5 TABLET ORAL DAILY
Status: DISCONTINUED | OUTPATIENT
Start: 2020-08-05 | End: 2020-08-08 | Stop reason: HOSPADM

## 2020-08-04 RX ORDER — WARFARIN SODIUM 3 MG/1
6 TABLET ORAL 3 TIMES WEEKLY
Status: DISCONTINUED | OUTPATIENT
Start: 2020-08-05 | End: 2020-08-08 | Stop reason: HOSPADM

## 2020-08-04 RX ORDER — QUETIAPINE FUMARATE 100 MG/1
100 TABLET, FILM COATED ORAL
Status: DISCONTINUED | OUTPATIENT
Start: 2020-08-04 | End: 2020-08-08 | Stop reason: HOSPADM

## 2020-08-04 RX ADMIN — CEFTRIAXONE SODIUM 1000 MG: 10 INJECTION, POWDER, FOR SOLUTION INTRAVENOUS at 18:49

## 2020-08-04 RX ADMIN — QUETIAPINE FUMARATE 100 MG: 100 TABLET ORAL at 23:13

## 2020-08-04 RX ADMIN — TRAMADOL HYDROCHLORIDE 50 MG: 50 TABLET, FILM COATED ORAL at 23:21

## 2020-08-04 RX ADMIN — MELATONIN 9 MG: 3 TAB ORAL at 23:13

## 2020-08-04 RX ADMIN — DOCUSATE SODIUM 100 MG: 100 CAPSULE, LIQUID FILLED ORAL at 23:13

## 2020-08-04 RX ADMIN — DONEPEZIL HYDROCHLORIDE 10 MG: 5 TABLET ORAL at 23:20

## 2020-08-04 RX ADMIN — WARFARIN SODIUM 4 MG: 4 TABLET ORAL at 23:21

## 2020-08-04 NOTE — ASSESSMENT & PLAN NOTE
· EKG shows rate controlled AFib  · Continue carvedilol  · Anticoagulated on warfarin, currently therapeutic    Patient currently on a regimen of 6 mg warfarin Monday Wednesday Friday, 4 mg Sunday Tuesday Thursday Saturday  ·  Daily INR

## 2020-08-04 NOTE — ASSESSMENT & PLAN NOTE
Wt Readings from Last 3 Encounters:   08/04/20 117 kg (257 lb 4 4 oz)   09/15/19 107 kg (235 lb 14 3 oz)   05/24/19 107 kg (235 lb 14 3 oz)       · Patient appears euvolemic  · Continue Bumex  · Low-sodium diet, daily weights

## 2020-08-04 NOTE — ED NOTES
1  Cc: cellulitis of right leg  2  Admission related to injury?: no  3  Orientation status: pt has hx of dimentia  4  Abnormal labs/abnormal focused assessment/vitals: sodium 146, CO2 34, albumin 3 2, protime 28 7, INR 2 68, PTT 52  5  Medication/drips: no running medications at this time  6  Time of last narcotics given: no  7  IV lines, drains, tubes: 20 R AC  8  Isolation status: standard  9  Skin check: redness and swelling of the right knee  10  Ambulation status: family reports pt uses a walker however due to present illness pt has had difficulty ambulating  11   ED RN name and phone number: Maggie Rose 140 Angeles Velázquez RN  08/04/20 5737

## 2020-08-04 NOTE — ASSESSMENT & PLAN NOTE
Patient presents complaining of acute onset of right knee pain, swelling, and redness starting last night  Patient denies any trauma to the knee or any recent falls, no history of knee replacement or hardware in the knee  Differential of cellulitis versus erysipelas, or less likely septic arthritis  · DVT study pending  · Patient without white blood cell count, lactic acid normal   Not meeting sepsis criteria  Will trend white blood cell count  · Blood cultures and procalcitonin pending  · X-ray of right knee pending  · Cellulitis appears to be superficial, does not appear to be involving the joint  However, patient does have increased swelling of knee, decreased range of motion, and pain with weight-bearing  Will consult orthopedics    No joint aspiration done in ED  · Neurovascular checks q 4  · Patient started on ceftriaxone in ED, will switch to cefazolin q 8  · Patient does have pain with weight-bearing, PT/OT consulted
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
## 2020-08-04 NOTE — ASSESSMENT & PLAN NOTE
· Patient with history of dementia, currently at mental status baseline  · Continue Seroquel, Lexapro, Aricept  · Delirium precautions

## 2020-08-05 ENCOUNTER — APPOINTMENT (INPATIENT)
Dept: ULTRASOUND IMAGING | Facility: HOSPITAL | Age: 85
DRG: 554 | End: 2020-08-05
Payer: MEDICARE

## 2020-08-05 LAB
ANION GAP SERPL CALCULATED.3IONS-SCNC: 4 MMOL/L (ref 4–13)
APPEARANCE FLD: ABNORMAL
ATRIAL RATE: 288 BPM
BASOPHILS # BLD AUTO: 0.02 THOUSANDS/ΜL (ref 0–0.1)
BASOPHILS NFR BLD AUTO: 0 % (ref 0–1)
BUN SERPL-MCNC: 19 MG/DL (ref 5–25)
CALCIUM SERPL-MCNC: 8.1 MG/DL (ref 8.3–10.1)
CHLORIDE SERPL-SCNC: 109 MMOL/L (ref 100–108)
CO2 SERPL-SCNC: 31 MMOL/L (ref 21–32)
COLOR FLD: ABNORMAL
CREAT SERPL-MCNC: 1.06 MG/DL (ref 0.6–1.3)
EOSINOPHIL # BLD AUTO: 0.14 THOUSAND/ΜL (ref 0–0.61)
EOSINOPHIL NFR BLD AUTO: 2 % (ref 0–6)
ERYTHROCYTE [DISTWIDTH] IN BLOOD BY AUTOMATED COUNT: 13.1 % (ref 11.6–15.1)
GFR SERPL CREATININE-BSD FRML MDRD: 61 ML/MIN/1.73SQ M
GLUCOSE SERPL-MCNC: 93 MG/DL (ref 65–140)
GRAM STN SPEC: NORMAL
GRAM STN SPEC: NORMAL
HCT VFR BLD AUTO: 38.4 % (ref 36.5–49.3)
HGB BLD-MCNC: 12.2 G/DL (ref 12–17)
IMM GRANULOCYTES # BLD AUTO: 0.02 THOUSAND/UL (ref 0–0.2)
IMM GRANULOCYTES NFR BLD AUTO: 0 % (ref 0–2)
INR PPP: 2.71 (ref 0.84–1.19)
LYMPHOCYTES # BLD AUTO: 2.15 THOUSANDS/ΜL (ref 0.6–4.47)
LYMPHOCYTES # SNV MANUAL: 21 %
LYMPHOCYTES NFR BLD AUTO: 24 % (ref 14–44)
MCH RBC QN AUTO: 31 PG (ref 26.8–34.3)
MCHC RBC AUTO-ENTMCNC: 31.8 G/DL (ref 31.4–37.4)
MCV RBC AUTO: 98 FL (ref 82–98)
MONOCYTES # BLD AUTO: 1.82 THOUSAND/ΜL (ref 0.17–1.22)
MONOCYTES NFR BLD AUTO: 20 % (ref 4–12)
NEUTROPHILS # BLD AUTO: 4.92 THOUSANDS/ΜL (ref 1.85–7.62)
NEUTROPHILS NFR SNV MANUAL: 79 %
NEUTS SEG NFR BLD AUTO: 54 % (ref 43–75)
NRBC BLD AUTO-RTO: 0 /100 WBCS
PLATELET # BLD AUTO: 146 THOUSANDS/UL (ref 149–390)
PMV BLD AUTO: 10.9 FL (ref 8.9–12.7)
POTASSIUM SERPL-SCNC: 4.3 MMOL/L (ref 3.5–5.3)
PROCALCITONIN SERPL-MCNC: <0.05 NG/ML
PROTHROMBIN TIME: 29 SECONDS (ref 11.6–14.5)
QRS AXIS: 95 DEGREES
QRSD INTERVAL: 132 MS
QT INTERVAL: 434 MS
QTC INTERVAL: 440 MS
RBC # BLD AUTO: 3.93 MILLION/UL (ref 3.88–5.62)
SITE: ABNORMAL
SODIUM SERPL-SCNC: 144 MMOL/L (ref 136–145)
T WAVE AXIS: -48 DEGREES
TOTAL CELLS COUNTED SPEC: 100
VENTRICULAR RATE: 62 BPM
WBC # BLD AUTO: 9.07 THOUSAND/UL (ref 4.31–10.16)
WBC # FLD MANUAL: ABNORMAL /UL (ref 0–200)

## 2020-08-05 PROCEDURE — 97163 PT EVAL HIGH COMPLEX 45 MIN: CPT

## 2020-08-05 PROCEDURE — 97530 THERAPEUTIC ACTIVITIES: CPT

## 2020-08-05 PROCEDURE — 85610 PROTHROMBIN TIME: CPT | Performed by: PHYSICIAN ASSISTANT

## 2020-08-05 PROCEDURE — 85025 COMPLETE CBC W/AUTO DIFF WBC: CPT | Performed by: PHYSICIAN ASSISTANT

## 2020-08-05 PROCEDURE — 99222 1ST HOSP IP/OBS MODERATE 55: CPT | Performed by: PHYSICIAN ASSISTANT

## 2020-08-05 PROCEDURE — 93010 ELECTROCARDIOGRAM REPORT: CPT | Performed by: INTERNAL MEDICINE

## 2020-08-05 PROCEDURE — 97167 OT EVAL HIGH COMPLEX 60 MIN: CPT

## 2020-08-05 PROCEDURE — 87205 SMEAR GRAM STAIN: CPT | Performed by: PHYSICIAN ASSISTANT

## 2020-08-05 PROCEDURE — 93971 EXTREMITY STUDY: CPT

## 2020-08-05 PROCEDURE — 87070 CULTURE OTHR SPECIMN AEROBIC: CPT | Performed by: PHYSICIAN ASSISTANT

## 2020-08-05 PROCEDURE — 80048 BASIC METABOLIC PNL TOTAL CA: CPT | Performed by: PHYSICIAN ASSISTANT

## 2020-08-05 PROCEDURE — 99222 1ST HOSP IP/OBS MODERATE 55: CPT | Performed by: INTERNAL MEDICINE

## 2020-08-05 PROCEDURE — 89060 EXAM SYNOVIAL FLUID CRYSTALS: CPT | Performed by: PHYSICIAN ASSISTANT

## 2020-08-05 PROCEDURE — 89051 BODY FLUID CELL COUNT: CPT | Performed by: PHYSICIAN ASSISTANT

## 2020-08-05 PROCEDURE — 0S9C3ZX DRAINAGE OF RIGHT KNEE JOINT, PERCUTANEOUS APPROACH, DIAGNOSTIC: ICD-10-PCS | Performed by: ORTHOPAEDIC SURGERY

## 2020-08-05 PROCEDURE — 93971 EXTREMITY STUDY: CPT | Performed by: SURGERY

## 2020-08-05 PROCEDURE — 20610 DRAIN/INJ JOINT/BURSA W/O US: CPT | Performed by: PHYSICIAN ASSISTANT

## 2020-08-05 RX ORDER — COLCHICINE 0.6 MG/1
0.6 TABLET ORAL 2 TIMES DAILY
Status: DISCONTINUED | OUTPATIENT
Start: 2020-08-05 | End: 2020-08-08 | Stop reason: HOSPADM

## 2020-08-05 RX ORDER — LIDOCAINE HYDROCHLORIDE 10 MG/ML
5 INJECTION, SOLUTION EPIDURAL; INFILTRATION; INTRACAUDAL; PERINEURAL ONCE
Status: DISCONTINUED | OUTPATIENT
Start: 2020-08-05 | End: 2020-08-07

## 2020-08-05 RX ADMIN — CEFAZOLIN SODIUM 1000 MG: 1 SOLUTION INTRAVENOUS at 03:15

## 2020-08-05 RX ADMIN — SPIRONOLACTONE 12.5 MG: 25 TABLET ORAL at 10:18

## 2020-08-05 RX ADMIN — DOCUSATE SODIUM 100 MG: 100 CAPSULE, LIQUID FILLED ORAL at 18:28

## 2020-08-05 RX ADMIN — CEFAZOLIN SODIUM 1000 MG: 1 SOLUTION INTRAVENOUS at 11:29

## 2020-08-05 RX ADMIN — PRAVASTATIN SODIUM 80 MG: 80 TABLET ORAL at 15:51

## 2020-08-05 RX ADMIN — FOLIC ACID TAB 400 MCG 400 MCG: 400 TAB at 10:18

## 2020-08-05 RX ADMIN — ACETAMINOPHEN 650 MG: 325 TABLET, FILM COATED ORAL at 06:00

## 2020-08-05 RX ADMIN — CARVEDILOL 12.5 MG: 12.5 TABLET, FILM COATED ORAL at 15:50

## 2020-08-05 RX ADMIN — WARFARIN SODIUM 6 MG: 3 TABLET ORAL at 18:27

## 2020-08-05 RX ADMIN — LEVOTHYROXINE SODIUM 25 MCG: 25 TABLET ORAL at 06:01

## 2020-08-05 RX ADMIN — CEFAZOLIN SODIUM 1000 MG: 1 SOLUTION INTRAVENOUS at 18:28

## 2020-08-05 RX ADMIN — DOCUSATE SODIUM 100 MG: 100 CAPSULE, LIQUID FILLED ORAL at 10:19

## 2020-08-05 RX ADMIN — BUMETANIDE 0.5 MG: 1 TABLET ORAL at 10:19

## 2020-08-05 RX ADMIN — CARVEDILOL 12.5 MG: 12.5 TABLET, FILM COATED ORAL at 08:00

## 2020-08-05 RX ADMIN — DONEPEZIL HYDROCHLORIDE 10 MG: 5 TABLET ORAL at 23:05

## 2020-08-05 RX ADMIN — COLCHICINE 0.6 MG: 0.6 TABLET, FILM COATED ORAL at 15:51

## 2020-08-05 RX ADMIN — MELATONIN 9 MG: 3 TAB ORAL at 23:05

## 2020-08-05 RX ADMIN — QUETIAPINE FUMARATE 100 MG: 100 TABLET ORAL at 23:05

## 2020-08-05 NOTE — ASSESSMENT & PLAN NOTE
· Right knee pain with cellulitis and effusion  · Lower extremity duplex negative for DVT  · Continue empiric cefazolin  · Arthrocentesis today awaiting by orthopedics crystal analysis and cultures  · Will add colchicine for possibility of crystalline arthropathy

## 2020-08-05 NOTE — H&P
H&P- Patti Oliveros 6/21/1929, 80 y o  male MRN: [id number]    Unit/Bed#: -01 Encounter: [id number]    Primary Care Provider: Basil Noonan DO   Date and time admitted to hospital: 8/4/2020  5:54 PM        * Cellulitis  Assessment & Plan  Patient presents complaining of acute onset of right knee pain, swelling, and redness starting last night  Patient denies any trauma to the knee or any recent falls, no history of knee replacement or hardware in the knee  Differential of cellulitis versus erysipelas, or less likely septic arthritis  · DVT study pending  · Patient without white blood cell count, lactic acid normal   Not meeting sepsis criteria  Will trend white blood cell count  · Blood cultures and procalcitonin pending  · X-ray of right knee pending  · Cellulitis appears to be superficial, does not appear to be involving the joint  However, patient does have increased swelling of knee, decreased range of motion, and pain with weight-bearing  Will consult orthopedics  No joint aspiration done in ED  · Neurovascular checks q 4  · Patient started on ceftriaxone in ED, will switch to cefazolin q 8  · Patient does have pain with weight-bearing, PT/OT consulted     Paroxysmal A-fib ([address] )  Assessment & Plan  · EKG shows rate controlled AFib  · Continue carvedilol  · Anticoagulated on warfarin, currently therapeutic    Patient currently on a regimen of 6 mg warfarin Monday Wednesday Friday, 4 mg Sunday Tuesday Thursday Saturday  ·  Daily INR    Mixed hyperlipidemia  Assessment & Plan  · Continue statin    Chronic diastolic congestive heart failure (HCC)  Assessment & Plan  Wt Readings from Last 3 Encounters:   08/04/20 117 kg (257 lb 4 4 oz)   09/15/19 107 kg (235 lb 14 3 oz)   05/24/19 107 kg (235 lb 14 3 oz)       · Patient appears euvolemic  · Continue Bumex  · Low-sodium diet, daily weights      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 [facility])  Assessment & Plan  · Patient with history of dementia, currently at mental status baseline  · Continue Seroquel, Lexapro, Aricept  · Delirium precautions    Acquired hypothyroidism  Assessment & Plan  · Continue levothyroxine    Essential hypertension  Assessment & Plan  · Continue home medications  · Monitor per unit protocol  · Blood pressures have been stable    VTE Prophylaxis: Warfarin (Coumadin)  / sequential compression device   Code Status:  Full code  POLST: POLST is not applicable to this patient  Discussion with family:  Patient and daughter, Ace Mercado    Anticipated Length of Stay:  Patient will be admitted on an Inpatient basis with an anticipated length of stay of  > 2 midnights  Justification for Hospital Stay:  Patient was cellulitis overlying the right knee, requiring IV antibiotics and orthopedics consultation to rule out septic arthritis    Total Time for Visit, including Counseling / Coordination of Care: 1 hour  Greater than 50% of this total time spent on direct patient counseling and coordination of care  Chief Complaint:     Chief Complaint   Patient presents with    Knee Pain     Pt presents with sudden onset right knee pain since last night  Family consulted the PCP who voiced concern for a clot  Pt reports sharp pain to the knee upon palpation         History of Present Illness:    Luan Mccallum is a 80 y o  male with history of AFib on warfarin, vascular dementia, diastolic heart failure, hyperlipidemia who presents with acute right knee pain  History is obtained from patient and daughter who is at bedside  She reports patient complained of some right knee pain starting last night, and this morning it got much worse and there was redness and swelling of his right knee  He denies any trauma to the knee or any recent falls  He does have history of right knee arthritis, but he reports this pain is different and more severe  He denies any radiation of the pain up into his thigh or down into his foot    Patient denies any history of blood clots in his legs or lungs  Patient is on warfarin, and is currently therapeutic, however his daughter mentions that last week he was subtherapeutic and his regimen for warfarin was increased due to this  Patient denies any previous surgery on his knee or any hardware in his right knee  His daughter does report he had 1 episode of chills 3 days ago, however at that time she took his temperature and he did not have fever  She denies any known cuts or bug bites to the knee, and no open wounds  Patient does have a history of chronic osteomyelitis of the left leg  Patient denies a history of diabetes or smoking  He denies any previous cellulitis  Patient does have a history of gout in his big toe  Patient's daughter reports he normally is able to get around with a walker, but today he was unable to get around without using a wheelchair due to pain with weight-bearing on his right foot  Patient denies any chest pain, shortness of breath, abdominal pain, nausea vomiting or diarrhea  Denies any recent sick contacts  Review of Systems:    Review of Systems   Constitutional: Positive for chills  Negative for activity change, fatigue and fever  HENT: Negative for congestion, postnasal drip, sinus pain and sore throat  Eyes: Negative for visual disturbance  Respiratory: Negative for cough, chest tightness, shortness of breath and wheezing  Cardiovascular: Positive for leg swelling  Negative for chest pain and palpitations  Gastrointestinal: Negative for abdominal pain, blood in stool, constipation, diarrhea, nausea and vomiting  Genitourinary: Negative for dysuria, flank pain and hematuria  Musculoskeletal: Positive for arthralgias and gait problem  Negative for back pain and myalgias  Skin: Negative for rash  Neurological: Negative for dizziness, light-headedness and headaches  Hematological: Does not bruise/bleed easily     Psychiatric/Behavioral: Negative for behavioral problems  Past Medical and Surgical History:     Past Medical History:   Diagnosis Date    Arthritis     Cardiac disorder     Disease of thyroid gland     Hyperlipidemia     Hypertension     [facility]     [facility]     Stroke ([facility] )     Thyroid disorder        Past Surgical History:   Procedure Laterality Date    CARDIAC PACEMAKER PLACEMENT      HIP SURGERY      LEG SURGERY      incision below knee       Meds/Allergies:    Prior to Admission medications    Medication Sig Start Date End Date Taking?  Authorizing Provider   bumetanide (BUMEX) 0 5 MG tablet Take 0 5 mg by mouth daily 2/2/19  Yes Historical Provider, MD   carvedilol (COREG) 12 5 mg tablet Take 12 5 mg by mouth 3 (three) times a day  5/20/19  Yes Historical Provider, MD   donepezil (ARICEPT) 10 mg tablet Take 10 mg by mouth daily at bedtime 1/16/15  Yes Historical Provider, MD   FOLIC ACID PO Take 1 capsule by mouth daily   Yes Historical Provider, MD   levothyroxine 25 mcg tablet Take 25 mcg by mouth daily in the early morning 2/20/15  Yes Historical Provider, MD   QUEtiapine (SEROquel) 25 mg tablet Take 100 mg by mouth daily at bedtime    Yes Historical Provider, MD   simvastatin (ZOCOR) 40 mg tablet Take 40 mg by mouth daily 12/3/18  Yes Historical Provider, MD   spironolactone (ALDACTONE) 25 mg tablet Take 12 5 mg by mouth daily 10/24/18  Yes Historical Provider, MD   traMADol (ULTRAM) 50 mg tablet Take 50 mg by mouth every 6 (six) hours as needed for moderate pain   Yes Historical Provider, MD   warfarin (COUMADIN) 4 mg tablet Take 6 mg by mouth daily at bedtime  9/29/16  Yes Historical Provider, MD   diclofenac sodium (VOLTAREN) 1 % Apply 2 g topically 3 (three) times a day as needed (knee pain/initial rx ) 9/15/19 10/15/19  Stephanie Diaz PA-C   escitalopram (LEXAPRO) 5 mg tablet Take 5 mg by mouth every morning    Historical Provider, MD   lidocaine (XYLOCAINE) 5 % ointment Apply topically 3 (three) times a day as needed for mild pain (knee pain/initial rx ) 9/15/19 10/15/19  Bhavna Angeles PA-C   methocarbamol (ROBAXIN) 750 mg tablet Take 1 tablet (750 mg total) by mouth 3 (three) times a day as needed (knee pain/initial rx ) for up to 5 days 9/15/19 9/20/19  Bhavna Angeles PA-C     I have reviewed home medications with patient family member  Allergies: Allergies   Allergen Reactions    Prednisone Confusion       Social History:     Marital Status:    Occupation:  Retired  Patient Pre-hospital Living Situation:  Private residence, lives with his daughter  Patient Pre-hospital Level of Mobility:  Walks with walker  Patient Pre-hospital Diet Restrictions:  Cardiac diet  Substance Use History:   Social History     Substance and Sexual Activity   Alcohol Use Never    Frequency: Never     Social History     Tobacco Use   Smoking Status Never Smoker   Smokeless Tobacco Never Used   Tobacco Comment    former smoker per allscript      Social History     Substance and Sexual Activity   Drug Use No       Family History:    non-contributory    Physical Exam:     Vitals:   Blood Pressure: 138/74 (08/04/20 1930)  Pulse: 61 (08/04/20 1930)  Temperature: 98 4 °F (36 9 °C) (08/04/20 1757)  Temp Source: Oral (08/04/20 1757)  Respirations: 21 (08/04/20 1930)  Weight - Scale: 117 kg (257 lb 4 4 oz) (08/04/20 1757)  SpO2: 97 % (08/04/20 1930)    Physical Exam   Constitutional: He appears well-developed  HENT:   Head: Normocephalic and atraumatic  Eyes: Pupils are equal, round, and reactive to light  Neck: Normal range of motion  Neck supple  Cardiovascular: Normal rate and regular rhythm  No murmur heard  Pulmonary/Chest: Effort normal and breath sounds normal  He has no wheezes  He has no rales  Abdominal: Soft  Bowel sounds are normal  There is no abdominal tenderness  Musculoskeletal:      Right knee: He exhibits decreased range of motion and swelling        Comments: Patient with well circumscribed area of redness overlying his right knee  Swelling of right knee  Decreased passive and active range of motion secondary to pain  Patient with 1+ dorsalis pedis pulses bilaterally, full sensation and strength in bilateral lower extremities  Patient with chronic open wound of left anterior shin without erythema, drainage, tenderness, or other signs of active infection  Neurological: He is alert  Skin: Skin is warm and dry  Vitals reviewed  Additional Data:     Lab Results: I have personally reviewed pertinent reports  Results from last 7 days   Lab Units 08/04/20  1812   WBC Thousand/uL 8 90   HEMOGLOBIN g/dL 13 3   HEMATOCRIT % 42 6   PLATELETS Thousands/uL 161   NEUTROS PCT % 68   LYMPHS PCT % 16   MONOS PCT % 15*   EOS PCT % 1     Results from last 7 days   Lab Units 08/04/20  1812   SODIUM mmol/L 146*   POTASSIUM mmol/L 4 0   CHLORIDE mmol/L 107   CO2 mmol/L 34*   BUN mg/dL 16   CREATININE mg/dL 1 09   ANION GAP mmol/L 5   CALCIUM mg/dL 8 5   ALBUMIN g/dL 3 2*   TOTAL BILIRUBIN mg/dL 0 50   ALK PHOS U/L 90   ALT U/L 23   AST U/L 15   GLUCOSE RANDOM mg/dL 73     Results from last 7 days   Lab Units 08/04/20  1812   INR  2 68*             Results from last 7 days   Lab Units 08/04/20  1812   LACTIC ACID mmol/L 1 4       Imaging: I have personally reviewed pertinent reports  VAS lower limb venous duplex study, unilateral/limited    (Results Pending)       EKG, Pathology, and Other Studies Reviewed on Admission:   · EKG:  Rate controlled atrial fibrillation, no ST depression or elevation    Allscripts / Epic Records Reviewed: Yes     ** Please Note: This note has been constructed using a voice recognition system   **

## 2020-08-05 NOTE — ASSESSMENT & PLAN NOTE
Wt Readings from Last 3 Encounters:   08/05/20 117 kg (257 lb 8 oz)   09/15/19 107 kg (235 lb 14 3 oz)   05/24/19 107 kg (235 lb 14 3 oz)     · Chronic diastolic CHF currently compensated    Continue bumex daily

## 2020-08-05 NOTE — OCCUPATIONAL THERAPY NOTE
Occupational Therapy Evaluation Note     Patient Name: Erika Rocha Date: 8/5/2020 08/05/20 1350   Note Type   Note type Eval only   Restrictions/Precautions   Weight Bearing Precautions Per Order Yes   RLE Weight Bearing Per Order WBAT   Braces or Orthoses Other (Comment)  (ace wrap intact,  none at baseline per pt)   Other Precautions Bed Alarm;Multiple lines;Cognitive; Fall Risk;WBS;Pain;Hard of hearing   Pain Assessment   Pain Assessment Tool 0-10   Pain Score No Pain   Home Living   Type of [address] [place] [address] One level;Ramped entrance;Performs ADLs on one level; Able to live on main level with bedroom/bathroom  (1st floor set up )   Bathroom Shower/Tub   (sits on chair inside "baby pool", needs assist for bathing )   Bathroom Toilet Raised   [address] Accessibility Accessible;Accessible via walker   [address] [facility]; Wheelchair-manual  (ambulatory with walker )   Prior Function   Level of [place] Needs assistance with ADLs and functional mobility; Needs assistance with IADLs   Lives With Daughter  (has 24/7 assist)   Receives Help From Family;Personal care attendant   ADL Assistance Needs assistance   IADLs Needs assistance   Falls in the last 6 months 0  ((+) fall history over 1 year ago )   Vocational Retired  (contractor)   Comments not currently driving    Lifestyle   Autonomy Pt lives with family in a one-level home with a ramped entrance  At baseline, pt needed assistance with ADLs and functional mobility  Prior to admission, pt ambulatory with use of RW  Reciprocal Relationships Supportive Family    Service to Others     Intrinsic Gratification [facility] to the [facility] [place]    Psychosocial   Psychosocial (WDL) WDL   ADL   Where Assessed Supine, bed   Equipment Provided Other (Comment)  (none )   Eating Assistance 5  Supervision/Setup   Eating Deficit Setup;Supervision/safety; Increased time to complete   Grooming Assistance 5 Supervision/Setup   Grooming Deficit Setup;Supervision/safety; Increased time to complete   UB Bathing Assistance 3  Moderate Assistance   UB Bathing Deficit Setup;Supervision/safety; Increased time to complete   LB Bathing Assistance 2  Maximal Assistance   LB Bathing Deficit Setup;Supervision/safety; Increased time to complete   UB Dressing Assistance 3  Moderate Assistance   UB Dressing Deficit Setup;Supervision/safety; Increased time to complete   LB Dressing Assistance 2  Maximal Assistance   LB Dressing Deficit Setup;Verbal cueing;Supervision/safety; Increased time to complete   Toileting Assistance  3  Moderate Assistance   Toileting Deficit Setup;Verbal cueing;Supervison/safety; Increased time to complete   Functional Assistance 3  Moderate Assistance   Functional Deficit Setup;Supervision/safety; Increased time to complete;Verbal cueing   Bed Mobility   Rolling L 3  Moderate assistance   Additional items Assist x 2;HOB elevated; Increased time required;LE management;Verbal cues   Supine to Sit 3  Moderate assistance   Additional items Assist x 2; Increased time required;LE management;Verbal cues; Bedrails   Sit to Supine 3  Moderate assistance   Additional items Assist x 2;HOB elevated; Increased time required;LE management;Verbal cues   Transfers   Sit to Stand 3  Moderate assistance   Additional items Assist x 2;HOB elevated; Increased time required;Verbal cues   Stand to Sit 3  Moderate assistance   Additional items Assist x 2;HOB elevated; Increased time required;Verbal cues   Functional Mobility   Functional Mobility 3  Moderate assistance   Additional Comments Pt functional ambulated by taking lateral steps to [facility], with mod A of 2 with RW  Additional items Rolling walker   Balance   Static Sitting Fair -   Dynamic Sitting Poor +   Static Standing Poor   Dynamic Standing Poor -   Activity Tolerance   Activity Tolerance Patient limited by fatigue   Nurse Made Aware RN verbalized pt appropriate for therapy   RN made aware of therapy outcomes  Therapist communicated with CM for recommendations at d/c     RUE Assessment   RUE Assessment WFL  (AROM WFL )   RUE Strength   RUE Overall Strength Deficits  (3+/5 proximal; 3/5 distal )   LUE Assessment   LUE Assessment WFL  (AROM WFL )   LUE Strength   LUE Overall Strength Deficits  (3+/5 proximal; 3/5 distal )   Hand Function   Gross Motor Coordination Impaired   Fine Motor Coordination Functional   Sensation   Light Touch No apparent deficits  (BUEs)   Vision-Basic Assessment   Current Vision No visual deficits   Visual History Other (Comment)  (none )   Patient Visual Report Other (Comment)  (no changes in vision )   Cognition   Overall Cognitive Status Impaired   Arousal/Participation Alert; Responsive; Cooperative   Attention Attends with cues to redirect   Orientation Level Oriented to person;Oriented to place; Disoriented to time;Disoriented to situation   Memory Decreased short term memory;Decreased recall of recent events   Following Commands Follows one step commands with increased time or repetition   Comments Pt alert and oriented to person and place  Pt demonstrated ability to attend and follow directives with verbal cues and increased time  Assessment   Limitation Decreased ADL status; Decreased UE strength;Decreased cognition;Decreased endurance;Decreased self-care trans;Decreased high-level ADLs   Prognosis Good   Assessment Pt is a 80 y o  M admitted to [place] [address] on 8/4/2020 with knee pain  Comorbidities limiting pt performance include: cellulitis, paroxysmal A-fib, mixed hyperlipidemia, chronic diastolic congestive heart failure, dementia arising in the senium and presenium, acquired hypothyroidism, and essential hypertension  OT orders received  Pt chart reviewed  Performed at least two patient identifiers including name and wrist band during session  Pt lives with family in a one-level home with a ramped entrance   At baseline, pt needed assistance with ADLs and functional mobility  Prior to admission, pt ambulatory with use of RW  Upon evaluation, pt was received supine in bed, alert and oriented to person and place, disoriented to time and situation  Pt daughter present during OT evaluation  Pt required supervision assist with set up for eating and grooming  Pt required mod A for UB ADLs, toileting assistance, and functional assistance  Pt required max A for LB ADLs  While completing bed mobility, pt required mod A of 2, with increased time, verbal cues, and LE management  Pt transferred and functional ambulated taking lateral steps with mod A of 2, with use of RW  Deficits limiting pt occupational performance at this time include: decreased high level cognitive skills, such as decreased short term memory, decreased alertness and orientation to time and situation, decreased activity tolerance, decreased endurance, increased pain in LE, decreased dynamic sitting and standing balance, and decreased engagement in daily activities  Occupational performance areas limited due to the deficits mentioned above include: bathing, dressing, grooming, toileting, functional mobility, and leisure/social participation  Pt would benefit from continued skilled OT sessions while admitted to the hospital to address the deficits mentioned above and to maximize functional independence  From an OT standpoint, recommendation at d/c would be short term rehab  Goals   Patient Goals "to walk again"   Plan   Treatment Interventions ADL retraining;Functional transfer training;UE strengthening/ROM; Endurance training;Patient/family training;Equipment evaluation/education; Compensatory technique education; Energy conservation; Activityengagement   Goal Expiration Date 08/19/20   OT Frequency 3-5x/wk   Recommendation   OT Discharge Recommendation Post-Acute Rehabilitation Services   Equipment Recommended Other (comment)  (none )   OT - OK to Discharge Yes   Barthel Index Feeding 5   Bathing 0   Grooming Score 0   Dressing Score 0   Bladder Score 0   Bowels Score 5   Toilet Use Score 5   Transfers (Bed/Chair) Score 5   Mobility (Level Surface) Score 0   Stairs Score 0   Barthel Index Score 20   Modified Thermal Scale   Modified [place] Scale 4     Goals:      Patient will demonstrate and verbalize good safety awareness and good energy conservation techniques while engaging in a functional actitivty with no more than 3 verbal cues       Patient will be alert and oriented to person, place, time, and situation for 80% of opportunities to increase functional independence and alertness during the day       Patient will engage in UB ADLs while standing at sink for 5 minutes with UE unilateral supportat mod I level to increase engagement in daily activities and improve dynamic standing balance      Patient will engage in LB ADLs while seated EOB at mod I level to increase engagement in daily activities and increase activity tolerance       Patient will transfer from chair/bed to chair/toilet at mod I level to increase engagement in daily activities       Patient will tolerate OOB for 3-5 hours per day to increase engagement in daily activities and increase engagement in leisure activities       Patient will engage in 1 new leisure activity per day to increase engagement in preferred activities

## 2020-08-05 NOTE — SOCIAL WORK
LOS 1 DAY  RISK OF UNPLANNED READMISSION SCORE 16  30 DAY READMISSION: NO  BUNDLE: NO    Per rounding, patient is from home with daughter  AX1 w/ walker, room O2  Ortho following patient for right knee aspiration  CM to assess and follow

## 2020-08-05 NOTE — PLAN OF CARE
Problem: PHYSICAL THERAPY ADULT  Goal: Performs mobility at highest level of function for planned discharge setting  See evaluation for individualized goals  Description: Treatment/Interventions: Functional transfer training, LE strengthening/ROM, Therapeutic exercise, Endurance training, Cognitive reorientation, Patient/family training, Equipment eval/education, Bed mobility, Spoke to nursing, Spoke to MD, OT, Family  Equipment Recommended: Walker(RW)       See flowsheet documentation for full assessment, interventions and recommendations  Note: Prognosis: Fair  Problem List: Decreased strength, Decreased range of motion, Decreased endurance, Impaired balance, Decreased mobility, Pain, Orthopedic restrictions, Decreased cognition, Decreased skin integrity  Assessment: Pt is 80 y o  male seen for PT evaluation on 8/5/2020 [facility] w/ Cellulitis  Patient presents complaining of acute onset of right knee pain, swelling, and redness starting last night  Patient denies any trauma to the knee or any recent falls, no history of knee replacement or hardware in the knee  Differential of cellulitis versus erysipelas, or less likely septic arthritis  Ortho aspirated his knee earlier this date  PT consulted to assess pt's functional mobility and d/c needs  Order placed for PT eval and tx, w/ up w/ A and WBAT B LE order  Performed at least 2 patient identifiers during session: Name and wristband  Comorbidities affecting pt's physical performance at time of assessment include: arthritis, cardiac disorder, disease of thyroid gland, HLD, HTN, osteomyelitis of leg, osteomyelitis of sacrum, stroke, thyroid disorder  PTA, pt was ambulates household distances, has 0 (ramp) SPENCER, lives w/ dtr in 1st level set up and has assist 24/7 from family, including posterior CGA during ambulation   Personal factors affecting pt at time of IE include: inaccessible home environment, inability to navigate level surfaces w/o external assistance, decreased cognition, positive fall history and decreased initiation and engagement  Please find objective findings from PT assessment regarding body systems outlined above with impairments and limitations including weakness, decreased ROM, impaired balance, decreased endurance, pain, decreased activity tolerance, fall risk, orthopedic restrictions, decreased skin integrity and decreased cognition, as well as mobility assessment (need for cueing for mobility technique)  The following objective measures performed on IE also reveal limitations: Barthel Index: 20/100 and Modified Kulwant: 5 (severe disability)  Pt's clinical presentation is currently unstable/unpredictable seen in pt's presentation of abnormal lab value(s), need for input for task focus and mobility technique and ongoing medical assessment  Pt to benefit from continued PT tx to address deficits as defined above and maximize level of functional independent mobility and consistency  From PT/mobility standpoint, recommendation at time of d/c would be STR pending progress in order to facilitate return to PLOF  Barriers to Discharge: Inaccessible home environment     PT Discharge Recommendation: [id number]     PT - OK to Discharge: Yes(when medically cleared if to [facility])    See flowsheet documentation for full assessment

## 2020-08-05 NOTE — PLAN OF CARE
Problem: Prexisting or High Potential for Compromised Skin Integrity  Goal: Skin integrity is maintained or improved  Description: INTERVENTIONS:  - Identify patients at risk for skin breakdown  - Assess and monitor skin integrity  - Assess and monitor nutrition and hydration status  - Monitor labs   - Assess for incontinence   - Turn and reposition patient  - Assist with mobility/ambulation  - Relieve pressure over bony prominences  - Avoid friction and shearing  - Provide appropriate hygiene as needed including keeping skin clean and dry  - Evaluate need for skin moisturizer/barrier cream  - Collaborate with interdisciplinary team   - Patient/family teaching  - Consider wound care consult   Outcome: Progressing     Problem: Potential for Falls  Goal: Patient will remain free of falls  Description: INTERVENTIONS:  - Assess patient frequently for physical needs  -  Identify cognitive and physical deficits and behaviors that affect risk of falls    -  [place] fall precautions as indicated by assessment   - Educate patient/family on patient safety including physical limitations  - Instruct patient to call for assistance with activity based on assessment  - Modify environment to reduce risk of injury  - Consider OT/PT consult to assist with strengthening/mobility  Outcome: Progressing

## 2020-08-05 NOTE — PROGRESS NOTES
Progress Note - Mireya Maher 6/21/1929, 80 y o  male MRN: [id number]    Unit/Bed#: -01 Encounter: [id number]    Primary Care Provider: Cherry Leggett DO   Date and time admitted to hospital: 8/4/2020  5:54 PM        * Cellulitis  Assessment & Plan  · Right knee pain with cellulitis and effusion  · Lower extremity duplex negative for DVT  · Continue empiric cefazolin  · Arthrocentesis today awaiting by orthopedics crystal analysis and cultures  · Will add colchicine for possibility of crystalline arthropathy    Paroxysmal A-fib (HCC)  Assessment & Plan  · Paroxysmal atrial fibrillation anticoagulated on warfarin    Results from last 7 days   Lab Units 08/05/20  0547 08/04/20  1812   INR  2 71* 2 68*         Mixed hyperlipidemia  Assessment & Plan  · Hyperlipidemia continue pravastatin    Chronic diastolic congestive heart failure (HCC)  Assessment & Plan  Wt Readings from Last 3 Encounters:   08/05/20 117 kg (257 lb 8 oz)   09/15/19 107 kg (235 lb 14 3 oz)   05/24/19 107 kg (235 lb 14 3 oz)     · Chronic diastolic CHF currently compensated  Continue bumex daily    Dementia without behavioral disturbance ([facility] [address] )  Assessment & Plan  · Dementia without behavioral disturbance  Continue donepezil and quetiapine     Acquired hypothyroidism  Assessment & Plan  · Hypothyroidism continue levothyroxine    Essential hypertension  Assessment & Plan  · Essential hypertension stable on carvedilol      VTE Pharmacologic Prophylaxis: Warfarin (Coumadin)    Patient Centered Rounds: I have performed bedside rounds with nursing staff today  Discussions with Specialists or Other Care Team Provider:  Physical therapy  Education and Discussions with Family / Patient:  Daughter    Time Spent for Care: 25 mins  More than 50% of total time spent on counseling and coordination of care as described above      Current Length of Stay: 1 day(s)  Current Patient Status: Inpatient     Certification Statement: The patient will continue to require additional inpatient hospital stay due to Cellulitis  Discharge Plan / Estimated Discharge Date: TBD    Code Status: Level 1 - Full Code  ______________________________________________________________________________    Subjective:   Patient seen and examined  Knee pain is better  Hurts with ambulation    Objective:   Vitals: Blood pressure 125/66, pulse 60, temperature 98 1 °F (36 7 °C), resp  rate 20, height 5' 8" (1 727 m), weight 117 kg (257 lb 8 oz), SpO2 94 %  Physical Exam:   General appearance: alert, appears stated age and cooperative  Head: Normocephalic, without obvious abnormality, atraumatic  Lungs: diminished breath sounds  Heart: regular rate and rhythm  Abdomen: soft, non-tender, positive bowel sounds   Back: negative  Extremities: Right knee tender palpation  Neurologic: Grossly normal    Additional Data:   Labs:  Results from last 7 days   Lab Units 08/05/20  0547 08/04/20  1812   WBC Thousand/uL 9 07 8 90   HEMOGLOBIN g/dL 12 2 13 3   HEMATOCRIT % 38 4 42 6   MCV fL 98 97   PLATELETS Thousands/uL 146* 161   INR  2 71* 2 68*     Results from last 7 days   Lab Units 08/05/20  0547 08/04/20  1812   SODIUM mmol/L 144 146*   POTASSIUM mmol/L 4 3 4 0   CHLORIDE mmol/L 109* 107   CO2 mmol/L 31 34*   ANION GAP mmol/L 4 5   BUN mg/dL 19 16   CREATININE mg/dL 1 06 1 09   CALCIUM mg/dL 8 1* 8 5   ALBUMIN g/dL  --  3 2*   TOTAL BILIRUBIN mg/dL  --  0 50   ALK PHOS U/L  --  90   ALT U/L  --  23   AST U/L  --  15   EGFR ml/min/1 73sq m 61 59   GLUCOSE RANDOM mg/dL 93 73                  Results from last 7 days   Lab Units 08/04/20  1812   LACTIC ACID mmol/L 1 4   PROCALCITONIN ng/ml <0 05                 * I Have Reviewed All Lab Data Listed Above  Cultures:   Results from last 7 days   Lab Units 08/05/20  0931 08/04/20  1821 08/04/20  1812   BLOOD CULTURE   --  Received in Microbiology Lab  Culture in Progress  Received in Microbiology Lab  Culture in Progress     GRAM STAIN RESULT 4+ Polys  No No bacteria seen  --   --              Imaging:  Imaging Reports Reviewed Today Include:   Xr Knee 3 Vw Right Non Injury    Result Date: 8/5/2020  Impression: No acute osseous abnormality  Degenerative changes as described  Small joint effusion  Workstation performed: [id number]     Scheduled Meds:  acetaminophen, 650 mg, Oral, Q6H PRN, Anita Gary PA-C  bumetanide, 0 5 mg, Oral, Daily, Anita Gary PA-C  carvedilol, 12 5 mg, Oral, BID With Meals, Anita Gary PA-C  cefazolin, 1,000 mg, Intravenous, Q8H, Anita Gary PA-C, Last Rate: 1,000 mg (08/05/20 1129)  docusate sodium, 100 mg, Oral, BID, Anita Gary PA-C  donepezil, 10 mg, Oral, HS, Anita Gary PA-C  folic acid, 057 mcg, Oral, Daily, Anita Gary PA-C  levothyroxine, 25 mcg, Oral, Early Morning, Anita Gary PA-C  lidocaine (PF), 5 mL, Infiltration, Once, [facility] Mcdonald  melatonin, 9 mg, Oral, HS, Anita Gary PA-C  ondansetron, 4 mg, Intravenous, Q6H PRN, Anita Gary PA-C  pravastatin, 80 mg, Oral, Daily With Shadi Loza PA-C  QUEtiapine, 100 mg, Oral, HS, Anita Gary PA-C  spironolactone, 12 5 mg, Oral, Daily, Anita Gary PA-C  traMADol, 50 mg, Oral, Q6H PRN, Anita Gary PA-C  warfarin, 4 mg, Oral, Once per day on Sun Tue Thu Sat, Anita Gary PA-C  warfarin, 6 mg, Oral, Once per day on Mon Wed Fri, Anita Gary PA-C        [facility] Internal Medicine  Hospitalist    ** Please Note: This note has been constructed using a voice recognition system   **

## 2020-08-05 NOTE — PLAN OF CARE
Problem: Prexisting or High Potential for Compromised Skin Integrity  Goal: Skin integrity is maintained or improved  Description: INTERVENTIONS:  - Identify patients at risk for skin breakdown  - Assess and monitor skin integrity  - Assess and monitor nutrition and hydration status  - Monitor labs   - Assess for incontinence   - Turn and reposition patient  - Assist with mobility/ambulation  - Relieve pressure over bony prominences  - Avoid friction and shearing  - Provide appropriate hygiene as needed including keeping skin clean and dry  - Evaluate need for skin moisturizer/barrier cream  - Collaborate with interdisciplinary team   - Patient/family teaching  - Consider wound care consult   Outcome: Progressing     Problem: Potential for Falls  Goal: Patient will remain free of falls  Description: INTERVENTIONS:  - Assess patient frequently for physical needs  -  Identify cognitive and physical deficits and behaviors that affect risk of falls    -  [place] fall precautions as indicated by assessment   - Educate patient/family on patient safety including physical limitations  - Instruct patient to call for assistance with activity based on assessment  - Modify environment to reduce risk of injury  - Consider OT/PT consult to assist with strengthening/mobility  Outcome: Progressing

## 2020-08-05 NOTE — CONSULTS
Orthopedics   Rolan Hernandez 80 y o  male MRN: [id number]  Unit/Bed#: [facility]      Chief Complaint:   Right knee pain    HPI:  80 y o  male with PMH CVA, CHF, A fib and on coumadin, HTN, hyperlipidemia, osteomyelitis Left lower extremity and sacrum, chronic knee pain, resting comfortably in bed and in no acute distress complaining of Right knee pain  Patient was sleeping throughout most of the exam and was difficult to obtain a history  He reports acute knee pain has been present for 2 days  He does not remember any initial injury or trauma or any PMH of gout or previous aspiration  Patient reported to ED yesterday secondary to knee pain because his daughter was concerned for DVT  Patient was admitted and also started on antibiotics secondary to mild cellulitis anterior knee  Patient denies numbness or tingling to Right lower extremity, chest pain, shortness of breath, abdominal pain, nausea, vomiting, constipation, diarrhea  Review Of Systems:   · Skin: Normal  · Neuro: See HPI  · Musculoskeletal: See HPI  · 14 point review of systems negative except as stated above     Past Medical History:   Past Medical History:   Diagnosis Date    Arthritis     Cardiac disorder     Disease of thyroid gland     Hyperlipidemia     Hypertension     [facility]omyelitis of leg ([facility] [address]     Osteomyelitis of sacrum ([facility] [address] [facility]     [facility]     Thyroid disorder        Past Surgical History:   Past Surgical History:   Procedure Laterality Date    CARDIAC PACEMAKER PLACEMENT      HIP SURGERY      LEG SURGERY      incision below knee       Family History:  Family history reviewed and non-contributory  Family History   Problem Relation Age of Onset    Diabetes Mother     Hypertension Mother        Social History:  Social History     Socioeconomic History    Marital status:       Spouse name: None    Number of children: None    Years of education: None    Highest education level: None   Occupational History    None Social Needs    Financial resource strain: None    Food insecurity     Worry: None     Inability: None    Transportation needs     Medical: None     Non-medical: None   Tobacco Use    Smoking status: Never Smoker    Smokeless tobacco: Never Used    Tobacco comment: former smoker per allscript    Substance and Sexual Activity    Alcohol use: Never     Frequency: Never    Drug use: No    Sexual activity: None   Lifestyle    Physical activity     Days per week: None     Minutes per session: None    Stress: None   Relationships    Social connections     Talks on phone: None     Gets together: None     Attends Zoroastrianism service: None     Active member of club or organization: None     Attends meetings of clubs or organizations: None     Relationship status: None    Intimate partner violence     Fear of current or ex partner: None     Emotionally abused: None     Physically abused: None     Forced sexual activity: None   Other Topics Concern    None   Social History Narrative    Drinks coffee       Allergies:    Allergies   Allergen Reactions    Prednisone Confusion           Labs:  0   Lab Value Date/Time    HCT 38 4 08/05/2020 0547    HCT 42 6 08/04/2020 1812    HCT 39 1 09/15/2019 1539    HCT 39 8 05/05/2014 1248    HGB 12 2 08/05/2020 0547    HGB 13 3 08/04/2020 1812    HGB 12 4 09/15/2019 1539    HGB 13 1 05/05/2014 1248    INR 2 71 (H) 08/05/2020 0547    WBC 9 07 08/05/2020 0547    WBC 8 90 08/04/2020 1812    WBC 7 50 09/15/2019 1539    WBC 6 1 05/05/2014 1248       Meds:    Current Facility-Administered Medications:     acetaminophen (TYLENOL) tablet 650 mg, 650 mg, Oral, Q6H PRN, NEUSIEDL, PA-C, 650 mg at 08/05/20 0600    bumetanide (BUMEX) tablet 0 5 mg, 0 5 mg, Oral, Daily, NEUSIEDL, PA-C    carvedilol (COREG) tablet 12 5 mg, 12 5 mg, Oral, BID With Meals, NEUSIEDL, PA-C, 12 5 mg at 08/05/20 0800    ceFAZolin (ANCEF) IVPB (premix) 1,000 mg 50 mL, 1,000 mg, Intravenous, Q8H, NEUSIEDL, PA-C, Last Rate: 100 mL/hr at 08/05/20 0315, 1,000 mg at 08/05/20 0315    docusate sodium (COLACE) capsule 100 mg, 100 mg, Oral, BID, NEUSIEDL, PA-C, 100 mg at 08/04/20 2313    donepezil (ARICEPT) tablet 10 mg, 10 mg, Oral, HS, NEUSIEDL, PA-C, 10 mg at 58/87/91 6726    folic acid (FOLVITE) tablet 400 mcg, 400 mcg, Oral, Daily, NEUSIEDL, PA-C    levothyroxine tablet 25 mcg, 25 mcg, Oral, Early Morning, NEUSIEDL, PA-C, 25 mcg at 08/05/20 0601    melatonin tablet 9 mg, 9 mg, Oral, HS, NEUSIEDL, PA-C, 9 mg at 08/04/20 2313    ondansetron (ZOFRAN) injection 4 mg, 4 mg, Intravenous, Q6H PRN, NEUSIEDL, PA-C    pravastatin (PRAVACHOL) tablet 80 mg, 80 mg, Oral, Daily With Severa Bos, PA-C    QUEtiapine (SEROquel) tablet 100 mg, 100 mg, Oral, HS, NEUSIEDL, PA-C, 100 mg at 08/04/20 2313    spironolactone (ALDACTONE) tablet 12 5 mg, 12 5 mg, Oral, Daily, NEUSIEDL, PA-C    traMADol Dyane Pellant) tablet 50 mg, 50 mg, Oral, Q6H PRN, NEUSIEDL, PA-C, 50 mg at 08/04/20 2321    warfarin (COUMADIN) tablet 4 mg, 4 mg, Oral, Once per day on Sun Tue Thu Sat, NEUSIEDL, PA-C, 4 mg at 08/04/20 2321    warfarin (COUMADIN) tablet 6 mg, 6 mg, Oral, Once per day on Mon Wed Fri, NEUSIEDL, PA-C    Blood Culture:   Lab Results   Component Value Date    BLOODCX Received in Microbiology Lab  Culture in Progress  08/04/2020       Wound Culture:   No results found for: WOUNDCULT    Ins and Outs:  I/O last 24 hours: In: 240 [P O :240]  Out: 233 [Urine:233]          Physical Exam:   /70   Pulse 60   Temp 98 2 °F (36 8 °C)   Resp 20   Ht 5' 8"   Wt 117 kg (257 lb 8 oz)   SpO2 97%   BMI 39 15 kg/m²   Gen: Alert and oriented to person, place, time  HEENT: EOMI, eyes clear, moist mucus membranes, hearing intact  Respiratory: Bilateral chest rise   No audible wheezing found  Cardiovascular: Regular Rate and Rhythm  Abdomen: soft nontender/nondistended    Musculoskeletal:   Right Lower extremity  · Skin intact with 3 distinct areas of minimal erythema at anterior and lateral aspect of knee  No warmth palpated, similar compared to contralateral extremity   · No tenderness to palpation   · AROM 0-45 flexion some discomfort with flexion and hip flexion  · Sensation intact L2-S1  · Motor intact hip flexion/extension, knee flexion/extension, ankle dorsi/plantarflexion and EHL/FHL  · 2+ DP pulse        Radiology:   Xrays taken in ED 08/04/2020 demonstrates severe degenarative changes medial, lateral and patellofemoral compartments  Osteophytes noted  No acute fracture       _*_*_*_*_*_*_*_*_*_*_*_*_*_*_*_*_*_*_*_*_*_*_*_*_*_*_*_*_*_*_*_*_*_*_*_*_*_*_*_*_*    Assessment:  89 y o male Right knee effusion      Plan:   · Weightbearing as tolerated Right lower extremity  · PT/OT for ambulation, knee ROM   · Pain control as per primary team  · DVT ppx as per primary team  · Antibiotics as per primary team   · Dispo: Aspiration of Right knee was consented and patient was draped and prepped in sterile fashion  Aspiration was performed at lateral portal of Right knee secondary to questionable erythema superior lateral knee  Patient tolerated procedure well and about 13mL serosanguinous fluid was aspirated  Sterile dressing applied  No immediate complications  Will await results of ultrasound to r/o DVT and results of fluid cultures from Right knee   Continue antibiotics as per primary team          Trung Pulido PA-C

## 2020-08-05 NOTE — PLAN OF CARE
Problem: OCCUPATIONAL THERAPY ADULT  Goal: Performs self-care activities at highest level of function for planned discharge setting  See evaluation for individualized goals  Description: Treatment Interventions: ADL retraining, Functional transfer training, UE strengthening/ROM, Endurance training, Patient/family training, Equipment evaluation/education, Compensatory technique education, Energy conservation, Activityengagement  Equipment Recommended: Other (comment)(none )       See flowsheet documentation for full assessment, interventions and recommendations  Note: Limitation: Decreased ADL status, Decreased UE strength, Decreased cognition, Decreased endurance, Decreased self-care trans, Decreased high-level ADLs  Prognosis: Good  Assessment: Pt is a 80 y o  M admitted to [facility] [address] [facility] on 8/4/2020 with knee pain  Comorbidities limiting pt performance include: cellulitis, paroxysmal A-fib, mixed hyperlipidemia, chronic diastolic congestive heart failure, dementia arising in the senium and presenium, acquired hypothyroidism, and essential hypertension  OT orders received  Pt chart reviewed  Performed at least two patient identifiers including name and wrist band during session  Pt lives with family in a one-level home with a ramped entrance  At baseline, pt needed assistance with ADLs and functional mobility  Prior to admission, pt ambulatory with use of RW  Upon evaluation, pt was received supine in bed, alert and oriented to person and place, disoriented to time and situation  Pt daughter present during OT evaluation  Pt required supervision assist with set up for eating and grooming  Pt required mod A for UB ADLs, toileting assistance, and functional assistance  Pt required max A for LB ADLs  While completing bed mobility, pt required mod A of 2, with increased time, verbal cues, and LE management  Pt transferred and functional ambulated taking lateral steps with mod A of 2, with use of RW  Deficits limiting pt occupational performance at this time include: decreased high level cognitive skills, such as decreased short term memory, decreased alertness and orientation to time and situation, decreased activity tolerance, decreased endurance, increased pain in LE, decreased dynamic sitting and standing balance, and decreased engagement in daily activities  Occupational performance areas limited due to the deficits mentioned above include: bathing, dressing, grooming, toileting, functional mobility, and leisure/social participation  Pt would benefit from continued skilled OT sessions while admitted to the hospital to address the deficits mentioned above and to maximize functional independence  From an OT standpoint, recommendation at d/c would be short term rehab  OT Discharge Recommendation: Post-Acute Rehabilitation Services  OT - OK to Discharge:  Yes

## 2020-08-05 NOTE — PHYSICAL THERAPY NOTE
Physical Therapy Evaluation     Patient's Name: Bell Bobo    Admitting Diagnosis  Knee pain [M25 569]  Cellulitis of right leg [G77 311]    Problem List  Patient Active Problem List   Diagnosis    Fall    Acute encephalopathy    Elevated serum creatinine    History of Coumadin therapy    Essential hypertension    Acquired hypothyroidism    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 ([facility] )    Chronic diastolic congestive heart failure (HCC)    Mixed hyperlipidemia    Paroxysmal A-fib ([facility] )    Primary osteoarthritis of right knee    Cellulitis       Past Medical History  Past Medical History:   Diagnosis Date    Arthritis     Cardiac disorder     Disease of thyroid gland     Hyperlipidemia     Hypertension     Osteomyelitis of leg ([facility] )     Osteomyelitis of sacrum ([facility] )     Stroke ([place] [address] )     Thyroid disorder        Past Surgical History  Past Surgical History:   Procedure Laterality Date    CARDIAC PACEMAKER PLACEMENT      HIP SURGERY      LEG SURGERY      incision below knee          08/05/20 1210   Note Type   Note type Eval/Treat   Pain Assessment   Pain Assessment Tool FLACC  (pt denies pain @ rest)   Pain Rating: FLACC (Rest) - Face 0   Pain Rating: FLACC (Rest) - Legs 0   Pain Rating: FLACC (Rest) - Activity 0   Pain Rating: FLACC (Rest) - Cry 0   Pain Rating: FLACC (Rest) - Consolability 0   Score: FLACC (Rest) 0   Pain Rating: FLACC (Activity) - Face 1   Pain Rating: FLACC (Activity) - Legs 1   Pain Rating: FLACC (Activity) - Activity 1   Pain Rating: FLACC (Activity) - Cry 1   Pain Rating: FLACC (Activity) - Consolability 1   Score: FLACC (Activity) 5   Home Living   Type of Home House   Home Layout Two level;Ramped entrance;Performs ADLs on one level; Able to live on main level with bedroom/bathroom  (ramp   1st floor set up, pt only stays downstairs)   Bathroom Shower/Tub   (sits on chair inside a "baby pool", has assist for bathing)   Bathroom Toilet Raised   Bathroom Equipment Commode  (sits over the toilet)   Bathroom Accessibility Accessible;Accessible via walker   [address] [facility]; Wheelchair-manual  (ambulatory with walker  Bed cane/rail  Cushions to offload buttocks)   Additional Comments aide Mon-Fri 8hr, some Sundays 4hr   Prior Function   Level of [place] Needs assistance with ADLs and functional mobility; Needs assistance with IADLs   Lives With Daughter  (has 24/7 assist)   Receives Help From Family;Personal care attendant   ADL Assistance Needs assistance   IADLs Needs assistance   Falls in the last 6 months 0  ((+) fall history)   Vocational Retired  (contractor)   Comments pt's dtr present at bedside and assisting with history   Restrictions/Precautions   Weight Bearing Precautions Per Order Yes   RLE Weight Bearing Per Order WBAT   Braces or Orthoses   (ace wrap intact,  none at baseline per dtr)   Other Precautions Bed Alarm;Cognitive;Multiple lines; Fall Risk;Pain;WBS;Hard of hearing   General   Family/Caregiver Present Yes   Cognition   Overall Cognitive Status Impaired   Arousal/Participation Alert  (sleepy)   Orientation Level Oriented to person;Oriented to place; Disoriented to time;Oriented to situation   Memory Decreased short term memory;Decreased recall of recent events;Decreased recall of precautions   Following Commands Follows one step commands with increased time or repetition   Comments pt agreeable to PT eval   RUE Assessment   RUE Assessment   (residual weakness from previous CVA)   LUE Assessment   LUE Assessment   (defer to OT eval for comments)   RLE Assessment   RLE Assessment X  (did not formally assess 2* pain)   LLE Assessment   LLE Assessment WFL  (grossly 3+/5)   Coordination   Movements are Fluid and Coordinated 1   Sensation WFL   Bed Mobility   Supine to Sit 2  Maximal assistance   Additional items Assist x 2;HOB elevated; Increased time required;Verbal cues;LE management   Sit to Supine 2  Maximal assistance   Additional items Assist x 2;HOB elevated; Increased time required;Verbal cues;LE management   Transfers   Sit to Stand 2  Maximal assistance   Additional items Assist x 2; Increased time required;Verbal cues   Stand to Sit 2  Maximal assistance   Additional items Assist x 2; Increased time required;Verbal cues   Additional Comments vc for weight shifting, offloading R LE in standing for enhanced comfort   Ambulation/Elevation   Gait pattern Not tested  (not able to perform egress test components)   Balance   Static Sitting Fair -   Dynamic Sitting Poor +   Static Standing Poor   Dynamic Standing Poor -   Ambulatory   (DNT)   Endurance Deficit   Endurance Deficit Yes   Activity Tolerance   Activity Tolerance Patient limited by fatigue;Patient limited by pain   Sara Cao verbalized pt appropriate to see, made aware of session outcome/recs  RN [facility] present to assist with mobility trial   Assessment   Prognosis Fair   Problem List Decreased strength;Decreased range of motion;Decreased endurance; Impaired balance;Decreased mobility;Pain;Orthopedic restrictions;Decreased cognition;Decreased skin integrity   Assessment Pt is 80 y o  male seen for PT evaluation on 8/5/2020 s/p admit to [facility] on 8/4/2020 w/ Cellulitis  Patient presents complaining of acute onset of right knee pain, swelling, and redness starting last night  Patient denies any trauma to the knee or any recent falls, no history of knee replacement or hardware in the knee  Differential of cellulitis versus erysipelas, or less likely septic arthritis  Ortho aspirated his knee earlier this date  PT consulted to assess pt's functional mobility and d/c needs  Order placed for PT eval and tx, w/ up w/ A and WBAT B LE order  Performed at least 2 patient identifiers during session: Name and wristband   Comorbidities affecting pt's physical performance at time of assessment include: arthritis, cardiac disorder, disease of thyroid gland, HLD, HTN, osteomyelitis of leg, osteomyelitis of sacrum, stroke, thyroid disorder  PTA, pt was ambulates household distances, has 0 (ramp) SPENCER, lives w/ dtr in 1st level set up and has assist 24/7 from family, including posterior CGA during ambulation  Personal factors affecting pt at time of IE include: inaccessible home environment, inability to navigate level surfaces w/o external assistance, decreased cognition, positive fall history and decreased initiation and engagement  Please find objective findings from PT assessment regarding body systems outlined above with impairments and limitations including weakness, decreased ROM, impaired balance, decreased endurance, pain, decreased activity tolerance, fall risk, orthopedic restrictions, decreased skin integrity and decreased cognition, as well as mobility assessment (need for cueing for mobility technique)  The following objective measures performed on IE also reveal limitations: Barthel Index: 20/100 and Modified Kulwant: 5 (severe disability)  Pt's clinical presentation is currently unstable/unpredictable seen in pt's presentation of abnormal lab value(s), need for input for task focus and mobility technique and ongoing medical assessment  Pt to benefit from continued PT tx to address deficits as defined above and maximize level of functional independent mobility and consistency  From PT/mobility standpoint, recommendation at time of d/c would be STR pending progress in order to facilitate return to PLOF     Barriers to Discharge Inaccessible home environment   Goals   Patient Goals "to walk again"   [facility] Expiration Date 08/15/20   Short Term Goal #1 In 7-10 days: Increase bilateral LE strength 1/2 grade to facilitate independent mobility, Perform all bed mobility tasks with min A of 1 to decrease caregiver burden, Perform all transfers with min A of 1 to improve independence, Increase all balance 1/2 grade to decrease risk for falls, Tolerate seated at EOB 30 minutes to facilitate functional task performance, Tolerate standing 2 minutes to facilitate functional task performance, Improve Barthel Index score to 35 or greater to facilitate independence, PT provider will perform functional balance assessment to determine fall risk and PT to see and establish goals for ambulation when appropriate   PT Treatment Day 1   Plan   Treatment/Interventions Functional transfer training;LE strengthening/ROM; Therapeutic exercise; Endurance training;Cognitive reorientation;Patient/family training;Equipment eval/education; Bed mobility;Spoke to nursing;Spoke to MD;OT;Family   PT Frequency 5x/wk   Recommendation   PT Discharge Recommendation Post-Acute Rehabilitation Services   Equipment Recommended Walker  (RW)   PT - OK to Discharge Yes  (when medically cleared if to STR)   Modified Kulwant Scale   Modified Kulwant Scale 5   Barthel Index   Feeding 5   Bathing 0   Grooming Score 0   Dressing Score 0   Bladder Score 0   Bowels Score 5   Toilet Use Score 5   Transfers (Bed/Chair) Score 5   Mobility (Level Surface) Score 0   Stairs Score 0   Barthel Index Score 20         Cortes Jordan, PT, DPT    Physical Therapy Treatment Note  Time In: 1225  Time Out: 1255  Total Time: 30 min     S:  Pt agreeable to PT treatment session s/p PT eval, in no apparent distress and responsive  O:  Pt seen for PT treatment session this date with interventions consisting of therapeutic activity consisting of training: bed mobility, sit<>stand transfers, static sitting tolerance at EOB for 12 minutes w/ B UE support, vc and tactile cues for static sitting posture faciliation and vc and tactile cues for static standing posture faciliation  VC required for technique  VC for pt to remain eyes open      A:  Pt able to tolerate seated at EOB for 12 min duration, no complaints of increased knee pain   FLACC score used as pt not the best historian, dtr reports that pt has "high pain tolerance"  VC for posture, symmetry in both sitting and standing positions  Pt able to tolerate 2nd standing trial, max A of 2 required for sit to stand transfer with maximal vc on appropriate hand/foot placement during transfers  Pt able to tolerate static standing position for <15 sec duration, vc for weight shifting towards LLE and appropriate usage of RW to offload R LE  Pt reporting standing tolerance more limited from fatigue than due to pain  A of 1 required for B rolling to assist nurse during skin check of bottom, education to pt provided on use of siderail to assist with holding on side  Post session: pt returned BTB, bed alarm engaged, all needs in reach and RN notified of session findings/recommendations      P:  Continue to recommend STR at time of d/c in order to maximize pt's functional independence and safety w/ mobility  Pt continues to be functioning below baseline level, and remains limited 2* factors listed above  PT will continue to see pt while here in order to address the deficits listed above and provide interventions consistent w/ POC in effort to achieve STGs      Ana Doll, PT, DPT

## 2020-08-05 NOTE — PHYSICAL THERAPY NOTE
Physical Therapy Cancellation Note    PT order received  Chart review performed  At this time, PT evaluation cancelled as pt pending ortho evaluation; will await PT mobility assessment until provided with recs & appropriate WBing order from ortho  PT will follow and evaluate as appropriate      Long Jules, PT, DPT

## 2020-08-05 NOTE — PROCEDURES
Procedure- Orthopedics   Iwona Devi 80 y o  male MRN: [id number]  Unit/Bed#: [facility]    Procedure: right knee aspiration    After sterile preparation of the lateral portal overlying the knee local anesthetic was provided with 5cc of 1% lidocaine  All attempts were made to avoid erythematous areas anterior and superior lateral knee  A clear area of skin was obtained at lateral portal of knee  An 18 gauge needle was then  inserted via a anterolateral portal   Approx  13cc of seroganguinous fluid was aspirated and sent for gram stain, culture, synovial WBC/RBC, and crystals  Sterile dressing was then applied  Pt tolerated the procedure well and was neurovascularly intact both pre and post procedure  Gabe MARRERO was present and helped with aspiration  I was present entire time      Miah Sage PA-C

## 2020-08-05 NOTE — ASSESSMENT & PLAN NOTE
· Paroxysmal atrial fibrillation anticoagulated on warfarin    Results from last 7 days   Lab Units 08/05/20  0547 08/04/20  1812   INR  2 71* 2 68*

## 2020-08-06 LAB
INR PPP: 2.79 (ref 0.84–1.19)
PROTHROMBIN TIME: 29.7 SECONDS (ref 11.6–14.5)

## 2020-08-06 PROCEDURE — 85610 PROTHROMBIN TIME: CPT | Performed by: PHYSICIAN ASSISTANT

## 2020-08-06 PROCEDURE — 99232 SBSQ HOSP IP/OBS MODERATE 35: CPT | Performed by: INTERNAL MEDICINE

## 2020-08-06 PROCEDURE — 99231 SBSQ HOSP IP/OBS SF/LOW 25: CPT | Performed by: PHYSICIAN ASSISTANT

## 2020-08-06 RX ADMIN — DOCUSATE SODIUM 100 MG: 100 CAPSULE, LIQUID FILLED ORAL at 17:17

## 2020-08-06 RX ADMIN — COLCHICINE 0.6 MG: 0.6 TABLET, FILM COATED ORAL at 17:17

## 2020-08-06 RX ADMIN — CEFAZOLIN SODIUM 1000 MG: 1 SOLUTION INTRAVENOUS at 11:56

## 2020-08-06 RX ADMIN — PRAVASTATIN SODIUM 80 MG: 80 TABLET ORAL at 17:17

## 2020-08-06 RX ADMIN — WARFARIN SODIUM 4 MG: 4 TABLET ORAL at 17:17

## 2020-08-06 RX ADMIN — DONEPEZIL HYDROCHLORIDE 10 MG: 5 TABLET ORAL at 21:50

## 2020-08-06 RX ADMIN — BUMETANIDE 0.5 MG: 1 TABLET ORAL at 09:45

## 2020-08-06 RX ADMIN — CARVEDILOL 12.5 MG: 12.5 TABLET, FILM COATED ORAL at 17:18

## 2020-08-06 RX ADMIN — CEFAZOLIN SODIUM 1000 MG: 1 SOLUTION INTRAVENOUS at 03:37

## 2020-08-06 RX ADMIN — LEVOTHYROXINE SODIUM 25 MCG: 25 TABLET ORAL at 06:39

## 2020-08-06 RX ADMIN — SPIRONOLACTONE 12.5 MG: 25 TABLET ORAL at 09:45

## 2020-08-06 RX ADMIN — COLCHICINE 0.6 MG: 0.6 TABLET, FILM COATED ORAL at 09:45

## 2020-08-06 RX ADMIN — DOCUSATE SODIUM 100 MG: 100 CAPSULE, LIQUID FILLED ORAL at 09:45

## 2020-08-06 RX ADMIN — CARVEDILOL 12.5 MG: 12.5 TABLET, FILM COATED ORAL at 07:50

## 2020-08-06 RX ADMIN — CEFAZOLIN SODIUM 1000 MG: 1 SOLUTION INTRAVENOUS at 19:59

## 2020-08-06 RX ADMIN — FOLIC ACID TAB 400 MCG 400 MCG: 400 TAB at 09:45

## 2020-08-06 RX ADMIN — MELATONIN 9 MG: 3 TAB ORAL at 21:50

## 2020-08-06 RX ADMIN — QUETIAPINE FUMARATE 100 MG: 100 TABLET ORAL at 21:50

## 2020-08-06 NOTE — ASSESSMENT & PLAN NOTE
· Paroxysmal atrial fibrillation anticoagulated on warfarin    Results from last 7 days   Lab Units 08/06/20  0531 08/05/20  0547 08/04/20  1812   INR  2 79* 2 71* 2 68*

## 2020-08-06 NOTE — SOCIAL WORK
LOS 2 DAYS  RISK OF UNPLANNED READMISSION SCORE 16  30 DAY READMISSION: NO  BUNDLE: NO    CM s/w Carrie from [address]  Jonathan Vance stating patient is accepted for services, [facility] date 8/10/2020 and provided with [facility] fax number: [phone]    CM met with patient and family bedside to review acceptance  Patient and daughter happy to have services from [facility]  Daughter asking if PT will be able to see patient at least one more time before discharge, CM requested PT visit tomorrow via TT  CM to follow through discharge

## 2020-08-06 NOTE — SOCIAL WORK
LOS 2 DAYS  RISK OF UNPLANNED READMISSION SCORE 16  30 DAY READMISSION: NO  BUNDLE: NO    CM met with patient and family bedside  Patient resides in a 2-story home with ramp to enter  Patient is able to complete all ADLs on 1-level with supervision, and lives with daughter, Grace Herron  Patient uses a cane, walker, wheelchair, and raised toilet seat PRN  VNA Hx, confirmed but unable to recall name of agency  Patient requested CM confirm agency name with Grace Herron, who is POA  Patient has HHAs M-F 8 hours/daily, and 4 hours some Sundays  No Hx STR, MH, or SA  PCP: Billie Meza    Preferred Pharmacy: [address], with no barriers to obtaining Rx identified  CM reviewed discharge planning process including the following: identifying help at home, patient preference for discharge planning needs, pharmacy preference, and availability of treatment team to discuss questions or concerns patient and/or family may have regarding understanding medications and recognizing signs and symptoms once discharged  CM also encouraged patient to follow up with all recommended appointments after discharge  Patient advised of importance for patient and family to participate in managing patients medical well being  CM name and role reviewed  Discharge Checklist reviewed and CM will continue to monitor for progress toward discharge goals in nursing and provider rounds  CM attempted phone call to Grace Herron, left  requesting CB  Per chart review, [facility] were previously referred to  CM pended referral to [facility] to ensure [facility] services upon discharge as patient is refusing STR as recommended at this time

## 2020-08-06 NOTE — ASSESSMENT & PLAN NOTE
· Right knee pain with cellulitis and effusion  · Lower extremity duplex negative for DVT  · Continue empiric cefazolin  · Arthrocentesis by orthopedics awaiting crystal analysis and cultures  · Continue empiric colchicine for possibility of crystalline arthropathy

## 2020-08-06 NOTE — PROGRESS NOTES
Progress Note - Ta Ho 6/21/1929, 80 y o  male MRN: [id number]    Unit/Bed#: -01 Encounter: [id number]    Primary Care Provider: Lynn Pandya DO   Date and time admitted to hospital: 8/4/2020  5:54 PM        * Cellulitis  Assessment & Plan  · Right knee pain with cellulitis and effusion  · Lower extremity duplex negative for DVT  · Continue empiric cefazolin  · Arthrocentesis by orthopedics awaiting crystal analysis and cultures  · Continue empiric colchicine for possibility of crystalline arthropathy    Paroxysmal A-fib (HCC)  Assessment & Plan  · Paroxysmal atrial fibrillation anticoagulated on warfarin    Results from last 7 days   Lab Units 08/06/20  0531 08/05/20  0547 08/04/20  1812   INR  2 79* 2 71* 2 68*         Mixed hyperlipidemia  Assessment & Plan  · Hyperlipidemia continue pravastatin    Chronic diastolic congestive heart failure (HCC)  Assessment & Plan  Wt Readings from Last 3 Encounters:   08/06/20 117 kg (257 lb 15 7 oz)   09/15/19 107 kg (235 lb 14 3 oz)   05/24/19 107 kg (235 lb 14 3 oz)     · Chronic diastolic CHF currently compensated  Continue bumex daily    Dementia without behavioral disturbance ([facility] )  Assessment & Plan  · Dementia without behavioral disturbance  Continue donepezil and quetiapine     Acquired hypothyroidism  Assessment & Plan  · Hypothyroidism continue levothyroxine    Essential hypertension  Assessment & Plan  · Essential hypertension stable on carvedilol      VTE Pharmacologic Prophylaxis: Warfarin (Coumadin)    Patient Centered Rounds: I have performed bedside rounds with nursing staff today  Discussions with Specialists or Other Care Team Provider:   Education and Discussions with Family / Patient:  Daughter    Time Spent for Care: 25 mins  More than 50% of total time spent on counseling and coordination of care as described above      Current Length of Stay: 2 day(s)  Current Patient Status: Inpatient     Certification Statement: The patient will continue to require additional inpatient hospital stay due to Cellulitis  Discharge Plan / Estimated Discharge Date:  Next 24-48 hours    Code Status: Level 1 - Full Code  ______________________________________________________________________________    Subjective:   Patient seen and examined  Still having right knee pains  Slept okay    Objective:   Vitals: Blood pressure 155/78, pulse 65, temperature 98 1 °F (36 7 °C), resp  rate 18, height 5' 8" (1 727 m), weight 117 kg (257 lb 15 7 oz), SpO2 96 %  Physical Exam:   General appearance: alert, appears stated age and cooperative  Head: Normocephalic, without obvious abnormality, atraumatic  Lungs: clear to auscultation bilaterally  Heart: regular rate and rhythm  Abdomen: soft, non-tender, positive bowel sounds   Back: negative, range of motion normal  Extremities: Right knee tender to range of motion but not to palpation  No surrounding erythema or warmth  Neurologic: Grossly normal    Additional Data:   Labs:  Results from last 7 days   Lab Units 08/06/20  0531 08/05/20  0547 08/04/20  1812   WBC Thousand/uL  --  9 07 8 90   HEMOGLOBIN g/dL  --  12 2 13 3   HEMATOCRIT %  --  38 4 42 6   MCV fL  --  98 97   PLATELETS Thousands/uL  --  146* 161   INR  2 79* 2 71* 2 68*     Results from last 7 days   Lab Units 08/05/20  0547 08/04/20  1812   SODIUM mmol/L 144 146*   POTASSIUM mmol/L 4 3 4 0   CHLORIDE mmol/L 109* 107   CO2 mmol/L 31 34*   ANION GAP mmol/L 4 5   BUN mg/dL 19 16   CREATININE mg/dL 1 06 1 09   CALCIUM mg/dL 8 1* 8 5   ALBUMIN g/dL  --  3 2*   TOTAL BILIRUBIN mg/dL  --  0 50   ALK PHOS U/L  --  90   ALT U/L  --  23   AST U/L  --  15   EGFR ml/min/1 73sq m 61 59   GLUCOSE RANDOM mg/dL 93 73                  Results from last 7 days   Lab Units 08/04/20  1812   LACTIC ACID mmol/L 1 4   PROCALCITONIN ng/ml <0 05                 * I Have Reviewed All Lab Data Listed Above      Cultures:   Results from last 7 days   Lab Units 08/05/20  0912 08/05/20  0930 08/04/20  1821 08/04/20  1812   BLOOD CULTURE   --   --  No Growth at 24 hrs  No Growth at 24 hrs  GRAM STAIN RESULT  4+ Polys  No No bacteria seen 1+ Polys  No organisms seen  --   --              Imaging:  Imaging Reports Reviewed Today Include:   Xr Knee 3 Vw Right Non Injury    Result Date: 8/5/2020  Impression: No acute osseous abnormality  Degenerative changes as described  Small joint effusion  Workstation performed: [id number]     Scheduled Meds:  acetaminophen, 650 mg, Oral, Q6H PRN, Rohini Randhawa PA-C  bumetanide, 0 5 mg, Oral, Daily, Rohini Randhawa PA-C  carvedilol, 12 5 mg, Oral, BID With Meals, Rohini Randhawa PA-C  cefazolin, 1,000 mg, Intravenous, Q8H, Rohini Randhawa PA-C, Last Rate: 1,000 mg (08/06/20 1156)  colchicine, 0 6 mg, Oral, BID, Ayaan Pelayo DO  docusate sodium, 100 mg, Oral, BID, Rohini Randhawa PA-C  donepezil, 10 mg, Oral, HS, Rohini Randhawa PA-C  folic acid, 582 mcg, Oral, Daily, Rohini Randhawa PA-C  levothyroxine, 25 mcg, Oral, Early Morning, Rohini Randhawa PA-C  lidocaine (PF), 5 mL, Infiltration, Once, Miah Sage PA-C  melatonin, 9 mg, Oral, HS, Rohini Randhawa PA-C  ondansetron, 4 mg, Intravenous, Q6H PRN, Rohini Randhawa PA-C  pravastatin, 80 mg, Oral, Daily With Marcos Collazo PA-C  QUEtiapine, 100 mg, Oral, HS, Rohini Randhawa PA-C  spironolactone, 12 5 mg, Oral, Daily, Rohini Randhawa PA-C  traMADol, 50 mg, Oral, Q6H PRN, Rohini Randhawa PA-C  warfarin, 4 mg, Oral, Once per day on Sun Tue Thu Sat, Rohini Randhawa PA-C  warfarin, 6 mg, Oral, Once per day on Mon Wed Fri, Rohini Randhawa PA-C        [facility] Internal Medicine  Hospitalist    ** Please Note: This note has been constructed using a voice recognition system   **

## 2020-08-06 NOTE — PROGRESS NOTES
Progress Note - Orthopedics   Kenna Peña 80 y o  male MRN: [id number]  Unit/Bed#: [facility]      Subjective:  Patient was in the hospital with acute knee pain and effusion  Patient had knee aspirated and fluid was sent for analysis  Still waiting on final results  Patient states he feels slight improvement  Still has limited range of motion but pain improved from yesterday  Denies any numbness or tingling lower extremity          Labs:  0   Lab Value Date/Time    HCT 38 4 08/05/2020 0547    HCT 42 6 08/04/2020 1812    HCT 39 1 09/15/2019 1539    HCT 39 8 05/05/2014 1248    HGB 12 2 08/05/2020 0547    HGB 13 3 08/04/2020 1812    HGB 12 4 09/15/2019 1539    HGB 13 1 05/05/2014 1248    INR 2 79 (H) 08/06/2020 0531    WBC 9 07 08/05/2020 0547    WBC 8 90 08/04/2020 1812    WBC 7 50 09/15/2019 1539    WBC 6 1 05/05/2014 1248       Meds:    Current Facility-Administered Medications:     acetaminophen (TYLENOL) tablet 650 mg, 650 mg, Oral, Q6H PRN, Anita Gary, PA-C, 650 mg at 08/05/20 0600    bumetanide (BUMEX) tablet 0 5 mg, 0 5 mg, Oral, Daily, Anitamelody Gary, PA-C, 0 5 mg at 08/05/20 1019    carvedilol (COREG) tablet 12 5 mg, 12 5 mg, Oral, BID With Meals, Anita Gary PA-C, 12 5 mg at 08/06/20 0750    ceFAZolin (ANCEF) IVPB (premix) 1,000 mg 50 mL, 1,000 mg, Intravenous, Q8H, Anita Gary PA-C, Last Rate: 100 mL/hr at 08/06/20 0337, 1,000 mg at 08/06/20 3932    colchicine (COLCRYS) tablet 0 6 mg, 0 6 mg, Oral, BID, Ayaan Pelayo, DO, 0 6 mg at 08/05/20 1551    docusate sodium (COLACE) capsule 100 mg, 100 mg, Oral, BID, Anitamelody Gary, PA-C, 100 mg at 08/05/20 1828    donepezil (ARICEPT) tablet 10 mg, 10 mg, Oral, HS, MOHINI Campos-NIC, 10 mg at 21/48/00 1047    folic acid (FOLVITE) tablet 400 mcg, 400 mcg, Oral, Daily, MOHINI Campos-NIC, 400 mcg at 08/05/20 1018    levothyroxine tablet 25 mcg, 25 mcg, Oral, Early Morning, MOHINI Campos-NIC, 25 mcg at 08/06/20 9655    lidocaine (PF) (XYLOCAINE-MPF) 1 % injection 5 mL, 5 mL, Infiltration, Once, Radha Sage PA-C    melatonin tablet 9 mg, 9 mg, Oral, HS, Rohini Randhawa PA-C, 9 mg at 08/05/20 2305    ondansetron (ZOFRAN) injection 4 mg, 4 mg, Intravenous, Q6H PRN, Rohini Randhawa PA-C    pravastatin (PRAVACHOL) tablet 80 mg, 80 mg, Oral, Daily With Marcos Collazo PA-C, 80 mg at 08/05/20 1551    QUEtiapine (SEROquel) tablet 100 mg, 100 mg, Oral, HS, Rohini Randhawa PA-C, 100 mg at 08/05/20 2305    spironolactone (ALDACTONE) tablet 12 5 mg, 12 5 mg, Oral, Daily, Rohini Randhawa PA-C, 12 5 mg at 08/05/20 1018    traMADol (ULTRAM) tablet 50 mg, 50 mg, Oral, Q6H PRN, Rohini Randhawa PA-C, 50 mg at 08/04/20 2321    warfarin (COUMADIN) tablet 4 mg, 4 mg, Oral, Once per day on Sun Tue Thu Sat, Rohini Randhawa PA-C, 4 mg at 08/04/20 2321    warfarin (COUMADIN) tablet 6 mg, 6 mg, Oral, Once per day on Mon Wed Fri, Rohini Randhawa PA-C, 6 mg at 08/05/20 1827    Blood Culture:   Lab Results   Component Value Date    BLOODCX No Growth at 24 hrs  08/04/2020       Wound Culture:   No results found for: WOUNDCULT    Ins and Outs:  I/O last 24 hours: In: 240 [P O :240]  Out: 250 [Urine:250]          Physical:  Vitals:    08/06/20 0709   BP: 155/78   Pulse: 65   Resp: 18   Temp: 98 1 °F (36 7 °C)   SpO2: 96%     Musculoskeletal: right Lower Extremity  · Skin intact with small area erythema noted at area where aspiration was performed  · Active range of motion 0-70 degrees with mild effusion  · Sensation is intact to light touch right lower extremity  · Patient able to flex and extend all toes and ankle  · Calf soft and nontender      Assessment:    89 y o male with severe degenerative changes right knee  Synovial fluid analysis so far shows white blood count in fluid 23,720  Gram stain preliminary show far shows no bacteria and preliminary cultures show no growth    We are still waiting crystal analysis and final culture results  Patient remains afebrile and no leukocytosis and with white blood count in fluid at 23,720 this is less likely septic joint  Plan:  · WBAT right lower extremity  · PT/OT continue treatment  · Pain control per medical team  · DVT ppx per medical team  · Dispo:   Will follow up after final results have returned    [facility]

## 2020-08-06 NOTE — ASSESSMENT & PLAN NOTE
Wt Readings from Last 3 Encounters:   08/06/20 117 kg (257 lb 15 7 oz)   09/15/19 107 kg (235 lb 14 3 oz)   05/24/19 107 kg (235 lb 14 3 oz)     · Chronic diastolic CHF currently compensated    Continue bumex daily

## 2020-08-07 LAB
CRYSTALS SNV QL MICRO: NORMAL
ERYTHROCYTE [DISTWIDTH] IN BLOOD BY AUTOMATED COUNT: 13 % (ref 11.6–15.1)
HCT VFR BLD AUTO: 35.7 % (ref 36.5–49.3)
HGB BLD-MCNC: 11.3 G/DL (ref 12–17)
INR PPP: 2.93 (ref 0.84–1.19)
MCH RBC QN AUTO: 30.7 PG (ref 26.8–34.3)
MCHC RBC AUTO-ENTMCNC: 31.7 G/DL (ref 31.4–37.4)
MCV RBC AUTO: 97 FL (ref 82–98)
PLATELET # BLD AUTO: 146 THOUSANDS/UL (ref 149–390)
PMV BLD AUTO: 10.7 FL (ref 8.9–12.7)
PROTHROMBIN TIME: 30.9 SECONDS (ref 11.6–14.5)
RBC # BLD AUTO: 3.68 MILLION/UL (ref 3.88–5.62)
WBC # BLD AUTO: 6.96 THOUSAND/UL (ref 4.31–10.16)

## 2020-08-07 PROCEDURE — 97535 SELF CARE MNGMENT TRAINING: CPT

## 2020-08-07 PROCEDURE — 85027 COMPLETE CBC AUTOMATED: CPT | Performed by: INTERNAL MEDICINE

## 2020-08-07 PROCEDURE — 97110 THERAPEUTIC EXERCISES: CPT

## 2020-08-07 PROCEDURE — 99232 SBSQ HOSP IP/OBS MODERATE 35: CPT | Performed by: INTERNAL MEDICINE

## 2020-08-07 PROCEDURE — 85610 PROTHROMBIN TIME: CPT | Performed by: PHYSICIAN ASSISTANT

## 2020-08-07 PROCEDURE — 99231 SBSQ HOSP IP/OBS SF/LOW 25: CPT | Performed by: PHYSICIAN ASSISTANT

## 2020-08-07 PROCEDURE — 97530 THERAPEUTIC ACTIVITIES: CPT

## 2020-08-07 PROCEDURE — 97116 GAIT TRAINING THERAPY: CPT

## 2020-08-07 RX ADMIN — COLCHICINE 0.6 MG: 0.6 TABLET, FILM COATED ORAL at 08:47

## 2020-08-07 RX ADMIN — BUMETANIDE 0.5 MG: 1 TABLET ORAL at 08:47

## 2020-08-07 RX ADMIN — DOCUSATE SODIUM 100 MG: 100 CAPSULE, LIQUID FILLED ORAL at 17:05

## 2020-08-07 RX ADMIN — CEFAZOLIN SODIUM 1000 MG: 1 SOLUTION INTRAVENOUS at 11:05

## 2020-08-07 RX ADMIN — SPIRONOLACTONE 12.5 MG: 25 TABLET ORAL at 08:48

## 2020-08-07 RX ADMIN — DONEPEZIL HYDROCHLORIDE 10 MG: 5 TABLET ORAL at 22:04

## 2020-08-07 RX ADMIN — TRAMADOL HYDROCHLORIDE 50 MG: 50 TABLET, FILM COATED ORAL at 06:10

## 2020-08-07 RX ADMIN — MELATONIN 9 MG: 3 TAB ORAL at 22:05

## 2020-08-07 RX ADMIN — DOCUSATE SODIUM 100 MG: 100 CAPSULE, LIQUID FILLED ORAL at 08:48

## 2020-08-07 RX ADMIN — COLCHICINE 0.6 MG: 0.6 TABLET, FILM COATED ORAL at 17:04

## 2020-08-07 RX ADMIN — LEVOTHYROXINE SODIUM 25 MCG: 25 TABLET ORAL at 06:10

## 2020-08-07 RX ADMIN — FOLIC ACID TAB 400 MCG 400 MCG: 400 TAB at 08:48

## 2020-08-07 RX ADMIN — CEFAZOLIN SODIUM 1000 MG: 1 SOLUTION INTRAVENOUS at 03:52

## 2020-08-07 RX ADMIN — PRAVASTATIN SODIUM 80 MG: 80 TABLET ORAL at 17:04

## 2020-08-07 RX ADMIN — WARFARIN SODIUM 6 MG: 3 TABLET ORAL at 17:05

## 2020-08-07 RX ADMIN — CARVEDILOL 12.5 MG: 12.5 TABLET, FILM COATED ORAL at 07:38

## 2020-08-07 RX ADMIN — QUETIAPINE FUMARATE 100 MG: 100 TABLET ORAL at 22:05

## 2020-08-07 RX ADMIN — CARVEDILOL 12.5 MG: 12.5 TABLET, FILM COATED ORAL at 17:04

## 2020-08-07 NOTE — ASSESSMENT & PLAN NOTE
· Right knee pain with effusion and question of cellulitis  · Lower extremity duplex negative for DVT  · Was on empiric cefazolin however with discontinue  · 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  · Continue colchicine for crystalline arthropathy

## 2020-08-07 NOTE — PHYSICAL THERAPY NOTE
PT Treatment Note     08/07/20 1356   Pain Assessment   Pain Assessment Tool FLACC   Pain Rating: FLACC (Rest) - Face 0   Pain Rating: FLACC (Rest) - Legs 0   Pain Rating: FLACC (Rest) - Activity 0   Pain Rating: FLACC (Rest) - Cry 0   Pain Rating: FLACC (Rest) - Consolability 0   Score: FLACC (Rest) 0   Pain Rating: FLACC (Activity) - Face 1   Pain Rating: FLACC (Activity) - Legs 0   Pain Rating: FLACC (Activity) - Activity 0   Pain Rating: FLACC (Activity) - Cry 1   Pain Rating: FLACC (Activity) - Consolability 0   Score: FLACC (Activity) 2   Restrictions/Precautions   Weight Bearing Precautions Per Order Yes   RLE Weight Bearing Per Order WBAT   General   Family/Caregiver Present Yes   Cognition   Overall Cognitive Status Impaired   Arousal/Participation Alert; Responsive; Cooperative   Attention Attends with cues to redirect   Orientation Level Oriented to person;Oriented to place; Disoriented to time;Disoriented to situation   Memory Decreased short term memory;Decreased recall of recent events;Decreased recall of precautions   Following Commands Follows one step commands with increased time or repetition   Transfers   Sit to Stand 4  Minimal assistance   Additional items Assist x 2; Increased time required;Verbal cues   Stand to Sit 4  Minimal assistance   Additional items Assist x 2; Increased time required;Verbal cues   Toilet transfer 4  Minimal assistance   Additional items Increased time required;Verbal cues; Assist x 2  (grab bars)   Additional Comments vc for weight shifting, offloading R LE; RW safety and placement in RW  Ambulation/Elevation   Gait pattern Decreased foot clearance; Wide BE; Antalgic;Decreased R stance; Inconsistent elisha; Step to  (vc for ambulation with walker)   Gait Assistance 4  Minimal assist   Additional items Assist x 2   Assistive Device Rolling walker   Distance 10'x2   Balance   Static Sitting Fair -   Dynamic Sitting Fair -   Static Standing Fair -   Dynamic Standing Fair - Endurance Deficit   Endurance Deficit Yes   Endurance Deficit Description fatigue, SOB   Activity Tolerance   Activity Tolerance Patient limited by fatigue;Patient limited by pain   Nurse Made Aware RN ok to see   Exercises   Hip Flexion Sitting;Bilateral;10 reps   Hip Abduction Sitting;10 reps;Bilateral   Hip Adduction Sitting;Bilateral;10 reps   Knee AROM Long Arc Quad Sitting;Bilateral;10 reps   Assessment   Prognosis Fair   Problem List Decreased strength;Decreased range of motion;Decreased endurance; Impaired balance;Decreased mobility;Pain;Orthopedic restrictions;Decreased cognition;Decreased skin integrity   Assessment Pt seen for PT treatment session this date with interventions consisting of gait training w/ emphasis on improving pt's ability to ambulate level surfaces x 10' x2 with VC for maintaining position in RW with min A of 2 provided by therapist with RW, Therapeutic exercise consisting of: AROM 10 reps B LE in sitting position and therapeutic activity consisting of training: sit<>stand transfers, stand pivot transfers towards R direction and commode transfer with armrests  Pt  Was co-treat with OT due to elevated medical status and increased assist level requiring skilled care  Pt agreeable to PT treatment session upon arrival, pt found seated OOB in recliner, pt  Was incontinent in recliner but unaware, in no apparent distress  In comparison to previous session, pt with improvements in mobility, ambulation tolerance  Post session: pt returned back to recliner, chair alarm engaged, all needs in reach and RN notified of session findings/recommendations Continue to recommend Home PT with family support at time of d/c in order to maximize pt's functional independence and safety w/ mobility  Pt continues to be functioning below baseline level, and remains limited 2* factors listed above and including strength, balance, endurance, mobility deficits   PT will continue to see pt while here in order to address the deficits listed above and provide interventions consistent w/ POC in effort to achieve STGs  Barriers to Discharge Inaccessible home environment   Goals   Patient Goals to walk again   STG Expiration Date 08/15/20   Short Term Goal #1 In 7-10 days: Increase bilateral LE strength 1/2 grade to facilitate independent mobility, Perform all bed mobility tasks with min A of 1 to decrease caregiver burden, Perform all transfers with min A of 1 to improve independence, Increase all balance 1/2 grade to decrease risk for falls, Tolerate seated at EOB 30 minutes to facilitate functional task performance, Tolerate standing 2 minutes to facilitate functional task performance, Improve Barthel Index score to 35 or greater to facilitate independence Pt  will ambulate 30' with RW supervised A x1 to increase functional mobility  PT Treatment Day 2   Plan   Treatment/Interventions Functional transfer training;LE strengthening/ROM; Therapeutic exercise; Endurance training;Bed mobility;Gait training;Patient/family training;Equipment eval/education   Progress Progressing toward goals   PT Frequency 5x/wk   Recommendation   PT Discharge Recommendation Post-Acute Rehabilitation Services   Equipment Recommended Walker  (RW)   PT - OK to Discharge Yes  (when medically cleared if to STR)       Helga Garcia, PT

## 2020-08-07 NOTE — PROGRESS NOTES
Progress Note - Orthopedics   Tomasa Pope 80 y o  male MRN: [id number]  Unit/Bed#: -01      Subjective:  Patient feeling better today  Lying in bed comfortable no acute distress    Synovial fluid analysis came back with no bacterial growth but was positive for calcium pyrophosphate crystals or pseudogout      Labs:  0   Lab Value Date/Time    HCT 35 7 (L) 08/07/2020 0436    HCT 38 4 08/05/2020 0547    HCT 42 6 08/04/2020 1812    HCT 39 8 05/05/2014 1248    HGB 11 3 (L) 08/07/2020 0436    HGB 12 2 08/05/2020 0547    HGB 13 3 08/04/2020 1812    HGB 13 1 05/05/2014 1248    INR 2 93 (H) 08/07/2020 0436    WBC 6 96 08/07/2020 0436    WBC 9 07 08/05/2020 0547    WBC 8 90 08/04/2020 1812    WBC 6 1 05/05/2014 1248       Meds:    Current Facility-Administered Medications:     acetaminophen (TYLENOL) tablet 650 mg, 650 mg, Oral, Q6H PRN, Silvia Mena PA-C, 650 mg at 08/05/20 0600    bumetanide (BUMEX) tablet 0 5 mg, 0 5 mg, Oral, Daily, Silvia Mena PA-C, 0 5 mg at 08/07/20 0847    carvedilol (COREG) tablet 12 5 mg, 12 5 mg, Oral, BID With Meals, Silvia Mena PA-C, 12 5 mg at 08/07/20 0738    ceFAZolin (ANCEF) IVPB (premix) 1,000 mg 50 mL, 1,000 mg, Intravenous, Q8H, Silvia Mena PA-C, Last Rate: 100 mL/hr at 08/07/20 0352, 1,000 mg at 08/07/20 0352    colchicine (COLCRYS) tablet 0 6 mg, 0 6 mg, Oral, BID, Ayaan Pelayo DO, 0 6 mg at 08/07/20 0847    docusate sodium (COLACE) capsule 100 mg, 100 mg, Oral, BID, Silvia Mena PA-C, 100 mg at 08/07/20 0848    donepezil (ARICEPT) tablet 10 mg, 10 mg, Oral, HS, Silvianerissa Mena PA-C, 10 mg at 66/05/17 2343    folic acid (FOLVITE) tablet 400 mcg, 400 mcg, Oral, Daily, Silvia Mena PA-C, 400 mcg at 08/07/20 0848    levothyroxine tablet 25 mcg, 25 mcg, Oral, Early Morning, Silvia Mena PA-C, 25 mcg at 08/07/20 0610    lidocaine (PF) (XYLOCAINE-MPF) 1 % injection 5 mL, 5 mL, Infiltration, Once, Chaim Blount PA-C   melatonin tablet 9 mg, 9 mg, Oral, HS, Daphene Sox, PA-C, 9 mg at 08/06/20 2150    ondansetron (ZOFRAN) injection 4 mg, 4 mg, Intravenous, Q6H PRN, Daphene Sox, PA-C    pravastatin (PRAVACHOL) tablet 80 mg, 80 mg, Oral, Daily With August Savin, PA-C, 80 mg at 08/06/20 1717    QUEtiapine (SEROquel) tablet 100 mg, 100 mg, Oral, HS, Daphene Sox, PA-C, 100 mg at 08/06/20 2150    spironolactone (ALDACTONE) tablet 12 5 mg, 12 5 mg, Oral, Daily, Daphene Sox, PA-C, 12 5 mg at 08/07/20 0848    traMADol (ULTRAM) tablet 50 mg, 50 mg, Oral, Q6H PRN, Daphene Sox, PA-C, 50 mg at 08/07/20 0610    warfarin (COUMADIN) tablet 4 mg, 4 mg, Oral, Once per day on Sun Tue Thu Sat, Daphene Sox, PA-C, 4 mg at 08/06/20 1717    warfarin (COUMADIN) tablet 6 mg, 6 mg, Oral, Once per day on Mon Wed Fri, Daphene Sox, PA-C, 6 mg at 08/05/20 1827    Blood Culture:   Lab Results   Component Value Date    BLOODCX No Growth at 48 hrs  08/04/2020       Wound Culture:   No results found for: WOUNDCULT    Ins and Outs:  I/O last 24 hours: In: 480 [P O :480]  Out: 1223 [Urine:1223]          Physical:  Vitals:    08/07/20 0703   BP: 126/66   Pulse: 64   Resp: 20   Temp: 97 6 °F (36 4 °C)   SpO2: 92%     Musculoskeletal: right knee  · Skin intact with no erythema or ecchymosis noted  Small puncture wound well-healed from prior aspiration  · Active range of motion 0-95 degrees with mild effusion  · Sensation is intact to light touch right lower extremity  · Calf soft and nontender      Assessment:  Patient with right knee severe degenerative changes and pseudogout  Synovial fluid analysis was negative for any bacterial growth but was positive for calcium pyrophosphate crystals, pseudogout  Recommend trying trial steroids if patient still complains of pain  No further orthopedic intervention needed      Plan:  · PT/OT continue treatment  · Weight-bearing as tolerated right lower extremity  · Pain Medication per medical team as needed  · Recommend trial steroids if still having pain for pseudogout  · Patient is orthopedic stable    May be discharged when medically stable and can follow up on an as-needed basis with doctor Mondragon in the office      Johan Multani

## 2020-08-07 NOTE — ASSESSMENT & PLAN NOTE
Wt Readings from Last 3 Encounters:   08/07/20 117 kg (257 lb 15 oz)   09/15/19 107 kg (235 lb 14 3 oz)   05/24/19 107 kg (235 lb 14 3 oz)     · Chronic diastolic CHF currently compensated    Continue bumex daily

## 2020-08-07 NOTE — PROGRESS NOTES
Progress Note - Maggie Spatz 6/21/1929, 80 y o  male MRN: [id number]    Unit/Bed#: -01 Encounter: [id number]    Primary Care Provider: Primo Pang DO   Date and time admitted to hospital: 8/4/2020  5:54 PM        * Cellulitis  Assessment & Plan  · Right knee pain with effusion and question of cellulitis  · Lower extremity duplex negative for DVT  · Was on empiric cefazolin however with discontinue  · 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  · Continue colchicine for crystalline arthropathy    Paroxysmal A-fib (HCC)  Assessment & Plan  · Paroxysmal atrial fibrillation anticoagulated on warfarin    Results from last 7 days   Lab Units 08/07/20  0436 08/06/20  0531 08/05/20  0547 08/04/20  1812   INR  2 93* 2 79* 2 71* 2 68*       Mixed hyperlipidemia  Assessment & Plan  · Hyperlipidemia continue pravastatin    Chronic diastolic congestive heart failure (HCC)  Assessment & Plan  Wt Readings from Last 3 Encounters:   08/07/20 117 kg (257 lb 15 oz)   09/15/19 107 kg (235 lb 14 3 oz)   05/24/19 107 kg (235 lb 14 3 oz)     · Chronic diastolic CHF currently compensated  Continue bumex daily    Dementia without behavioral disturbance ([facility] [address] )  Assessment & Plan  · Dementia without behavioral disturbance  Continue donepezil and quetiapine     Acquired hypothyroidism  Assessment & Plan  · Hypothyroidism continue levothyroxine    Essential hypertension  Assessment & Plan  · Essential hypertension stable on carvedilol      VTE Pharmacologic Prophylaxis: Warfarin (Coumadin)    Patient Centered Rounds: I have performed bedside rounds with nursing staff today  Discussions with Specialists or Other Care Team Provider:  Case management  Education and Discussions with Family / Patient:  Daughter    Time Spent for Care: 25 mins  More than 50% of total time spent on counseling and coordination of care as described above      Current Length of Stay: 3 day(s)  Current Patient Status: Inpatient     Certification Statement: The patient will continue to require additional inpatient hospital stay due to Cellulitis  Discharge Plan / Estimated Discharge Date:  Re-evaluate for discharge tomorrow if able to ambulate    Code Status: Level 1 - Full Code  ______________________________________________________________________________    Subjective:   Patient seen and examined  Feeling better today, able to ambulate with help of aide    Objective:   Vitals: Blood pressure 122/65, pulse 64, temperature 98 °F (36 7 °C), resp  rate 18, height 5' 8" (1 727 m), weight 117 kg (257 lb 15 oz), SpO2 96 %      Physical Exam:   General appearance: alert, appears stated age and cooperative  Head: Normocephalic, without obvious abnormality, atraumatic  Lungs: diminished breath sounds  Heart: regular rate and rhythm  Abdomen: soft, non-tender, positive bowel sounds   Back: negative, range of motion normal  Extremities: edema lower extremities bilaterally  Neurologic: Grossly normal    Additional Data:   Labs:  Results from last 7 days   Lab Units 08/07/20  0436 08/06/20  0531 08/05/20  0547 08/04/20  1812   WBC Thousand/uL 6 96  --  9 07 8 90   HEMOGLOBIN g/dL 11 3*  --  12 2 13 3   HEMATOCRIT % 35 7*  --  38 4 42 6   MCV fL 97  --  98 97   PLATELETS Thousands/uL 146*  --  146* 161   INR  2 93* 2 79* 2 71* 2 68*     Results from last 7 days   Lab Units 08/05/20  0547 08/04/20  1812   SODIUM mmol/L 144 146*   POTASSIUM mmol/L 4 3 4 0   CHLORIDE mmol/L 109* 107   CO2 mmol/L 31 34*   ANION GAP mmol/L 4 5   BUN mg/dL 19 16   CREATININE mg/dL 1 06 1 09   CALCIUM mg/dL 8 1* 8 5   ALBUMIN g/dL  --  3 2*   TOTAL BILIRUBIN mg/dL  --  0 50   ALK PHOS U/L  --  90   ALT U/L  --  23   AST U/L  --  15   EGFR ml/min/1 73sq m 61 59   GLUCOSE RANDOM mg/dL 93 73                  Results from last 7 days   Lab Units 08/04/20  1812   LACTIC ACID mmol/L 1 4   PROCALCITONIN ng/ml <0 05                 * I Have Reviewed All Lab Data Listed Above     Cultures:   Results from last 7 days   Lab Units 08/05/20  0931 08/05/20  0930 08/04/20  1821 08/04/20  1812   BLOOD CULTURE   --   --  No Growth at 48 hrs  No Growth at 48 hrs  GRAM STAIN RESULT  4+ Polys  No No bacteria seen 1+ Polys  No organisms seen  --   --    BODY FLUID CULTURE, STERILE   --  No growth  --   --              Imaging:  Imaging Reports Reviewed Today Include:   Xr Knee 3 Vw Right Non Injury    Result Date: 8/5/2020  Impression: No acute osseous abnormality  Degenerative changes as described  Small joint effusion   Workstation performed: [id number]     Scheduled Meds:  Current Facility-Administered Medications   Medication Dose Route Frequency Provider Last Rate    acetaminophen  650 mg Oral Q6H PRN Clint Antu, PA-C      bumetanide  0 5 mg Oral Daily [place], [place]      carvedilol  12 5 mg Oral BID With Meals [place], [place]      cefazolin  1,000 mg Intravenous Q8H Clint Antu, PA-C 1,000 mg (08/07/20 1105)    colchicine  0 6 mg Oral BID Shyla Teixeira DO      docusate sodium  100 mg Oral BID Clint Antu, PA-C      donepezil  10 mg Oral HS Clint [facility], PA-C      folic acid  996 mcg Oral Daily [place], [place]      levothyroxine  25 mcg Oral Early Morning Clint Antu, PA-C      lidocaine (PF)  5 mL Infiltration Once Bonilla Alert Lawrence, [facility]      melatonin  9 mg Oral HS Clint Antu, PA-C      ondansetron  4 mg Intravenous Q6H PRN Clint Antu, PA-C      pravastatin  80 mg Oral Daily With [email or web address], [place]      QUEtiapine  100 mg Oral HS Clint Antu, PA-C      spironolactone  12 5 mg Oral Daily Clint [place], [place]      traMADol  50 mg Oral Q6H PRN Clnit Antu, PA-C      warfarin  4 mg Oral Once per day on Sun Tue Thu Sat Clint Antu, PA-C      warfarin  6 mg Oral Once per day on Mon Wed Fri Clint Antu, PA-C         Shyla Teixeira DO  [address] Internal Medicine  Hospitalist    ** Please Note: This note has been constructed using a voice recognition system   **

## 2020-08-07 NOTE — PLAN OF CARE
Problem: PHYSICAL THERAPY ADULT  Goal: Performs mobility at highest level of function for planned discharge setting  See evaluation for individualized goals  Description: Treatment/Interventions: Functional transfer training, LE strengthening/ROM, Therapeutic exercise, Endurance training, Cognitive reorientation, Patient/family training, Equipment eval/education, Bed mobility, Spoke to nursing, Spoke to MD, OT, Family  Equipment Recommended: Walker(RW)       See flowsheet documentation for full assessment, interventions and recommendations  8/7/2020 1627 by Juda Pallas, PT  Note: Prognosis: Fair  Problem List: Decreased strength, Decreased range of motion, Decreased endurance, Impaired balance, Decreased mobility, Pain, Orthopedic restrictions, Decreased cognition, Decreased skin integrity  Assessment: Pt seen for PT treatment session this date with interventions consisting of gait training w/ emphasis on improving pt's ability to ambulate level surfaces x 10' x2 with VC for maintaining position in RW with min A of 2 provided by therapist with RW, Therapeutic exercise consisting of: AROM 10 reps B LE in sitting position and therapeutic activity consisting of training: sit<>stand transfers, stand pivot transfers towards R direction and commode transfer with armrests  Pt  Was co-treat with OT due to elevated medical status and increased assist level requiring skilled care  Pt agreeable to PT treatment session upon arrival, pt found seated OOB in recliner, pt  Was incontinent in recliner but unaware, in no apparent distress  In comparison to previous session, pt with improvements in mobility, ambulation tolerance  Post session: pt returned back to recliner, chair alarm engaged, all needs in reach and RN notified of session findings/recommendations Continue to recommend Home PT with family support at time of d/c in order to maximize pt's functional independence and safety w/ mobility   Pt continues to be functioning below baseline level, and remains limited 2* factors listed above and including strength, balance, endurance, mobility deficits  PT will continue to see pt while here in order to address the deficits listed above and provide interventions consistent w/ POC in effort to achieve STGs  Barriers to Discharge: Inaccessible home environment     PT Discharge Recommendation: Home with skilled therapy     PT - OK to Discharge: Yes    See flowsheet documentation for full assessment  8/7/2020 1626 by Angelo Fletcher PT  Outcome: Progressing  Note: Prognosis: Fair  Problem List: Decreased strength, Decreased range of motion, Decreased endurance, Impaired balance, Decreased mobility, Pain, Orthopedic restrictions, Decreased cognition, Decreased skin integrity  Assessment: Pt seen for PT treatment session this date with interventions consisting of gait training w/ emphasis on improving pt's ability to ambulate level surfaces x 10' x2 with VC for maintaining position in RW with min A of 2 provided by therapist with RW, Therapeutic exercise consisting of: AROM 10 reps B LE in sitting position and therapeutic activity consisting of training: sit<>stand transfers, stand pivot transfers towards R direction and commode transfer with armrests  Pt  Was co-treat with OT due to elevated medical status and increased assist level requiring skilled care  Pt agreeable to PT treatment session upon arrival, pt found seated OOB in recliner, pt  Was incontinent in recliner but unaware, in no apparent distress  In comparison to previous session, pt with improvements in mobility, ambulation tolerance  Post session: pt returned back to recliner, chair alarm engaged, all needs in reach and RN notified of session findings/recommendations Continue to recommend Home PT with family support at time of d/c in order to maximize pt's functional independence and safety w/ mobility   Pt continues to be functioning below baseline level, and remains limited 2* factors listed above and including strength, balance, endurance, mobility deficits  PT will continue to see pt while here in order to address the deficits listed above and provide interventions consistent w/ POC in effort to achieve STGs  PT Discharge Recommendation: Home with Home health PT and family support     PT - OK to Discharge: Yes    See flowsheet documentation for full assessment

## 2020-08-07 NOTE — SOCIAL WORK
LOS 3 DAYS  RISK OF UNPLANNED READMISSION SCORE 18  30 DAY READMISSION: NO  BUNDLE: NO    Per ortho note, patient is weight-bearing as tolerated of the right lower extremity  Patient is recommended to f/u with ortho OP

## 2020-08-07 NOTE — PLAN OF CARE
Problem: Prexisting or High Potential for Compromised Skin Integrity  Goal: Skin integrity is maintained or improved  Description: INTERVENTIONS:  - Identify patients at risk for skin breakdown  - Assess and monitor skin integrity  - Assess and monitor nutrition and hydration status  - Monitor labs   - Assess for incontinence   - Turn and reposition patient  - Assist with mobility/ambulation  - Relieve pressure over bony prominences  - Avoid friction and shearing  - Provide appropriate hygiene as needed including keeping skin clean and dry  - Evaluate need for skin moisturizer/barrier cream  - Collaborate with interdisciplinary team   - Patient/family teaching  - Consider wound care consult   Outcome: Progressing     Problem: Potential for Falls  Goal: Patient will remain free of falls  Description: INTERVENTIONS:  - Assess patient frequently for physical needs  -  Identify cognitive and physical deficits and behaviors that affect risk of falls    -  [place] fall precautions as indicated by assessment   - Educate patient/family on patient safety including physical limitations  - Instruct patient to call for assistance with activity based on assessment  - Modify environment to reduce risk of injury  - Consider OT/PT consult to assist with strengthening/mobility  Outcome: Progressing

## 2020-08-07 NOTE — OCCUPATIONAL THERAPY NOTE
Occupational Therapy Treatment Note    Patient Name: Bell Ortiz Date: 8/7/2020 08/07/20 1318   Restrictions/Precautions   Weight Bearing Precautions Per Order Yes   RLE Weight Bearing Per Order WBAT   Braces or Orthoses Other (Comment)  (none at baseline )   Other Precautions Chair Alarm;Cognitive;Telemetry; Fall Risk;Pain;Hard of hearing   Pain Assessment   Pain Assessment Tool FLACC   Pain Rating: FLACC (Rest) - Face 0   Pain Rating: FLACC (Rest) - Legs 0   Pain Rating: FLACC (Rest) - Activity 0   Pain Rating: FLACC (Rest) - Cry 0   Pain Rating: FLACC (Rest) - Consolability 0   Score: FLACC (Rest) 0   Pain Rating: FLACC (Activity) - Face 1   Pain Rating: FLACC (Activity) - Legs 0   Pain Rating: FLACC (Activity) - Activity 0   Pain Rating: FLACC (Activity) - Cry 1   Pain Rating: FLACC (Activity) - Consolability 0   Score: FLACC (Activity) 2   Transfers   Sit to Stand 4  Minimal assistance   Additional items Assist x 2; Increased time required;Verbal cues;Armrests   Stand to Sit 4  Minimal assistance   Additional items Assist x 2; Increased time required;Verbal cues;Armrests   Toilet transfer 4  Minimal assistance   Additional items Assist x 2; Increased time required;Commode;Armrests   Functional Mobility   Functional Mobility 4  Minimal assistance   Additional Comments Pt functionally ambulated to bathroom with min A of 1, with use of RW  Additional items Rolling walker   Therapeutic Exercise - ROM   UE-ROM Yes   ROM- Right Upper Extremities   R Shoulder AROM; Extension;Flexion; Horizontal ABduction; External rotation; Internal rotation   R Elbow AROM;Elbow flexion;Elbow extension   R Wrist AROM; Wrist flexion;Wrist extension   R Weight/Reps/Sets Againist gravity/10/1   ROM - Left Upper Extremities    L Shoulder AROM; Flexion; Extension;Horizontal ABduction; External rotation; Internal rotation   L Elbow AROM;Elbow flexion;Elbow extension   L Wrist AROM; Wrist flexion;Wrist extension   L Weight/Reps/Sets Against gravity/10/1   Cognition   Overall Cognitive Status Impaired   Arousal/Participation Alert; Responsive; Cooperative   Attention Attends with cues to redirect   Orientation Level Oriented to person;Oriented to place; Disoriented to time;Disoriented to situation   Memory Decreased short term memory;Decreased recall of recent events   Following Commands Follows one step commands with increased time or repetition   Comments Pt alert and oriented to person and place, disoriented to time and situation  Additional Activities   Additional Activities Other (Comment)  (Family education)   Activity Tolerance   Activity Tolerance Patient limited by fatigue   Medical Staff Made Aware RN verbalized pt appropriate for therapy  RN made aware of therapy outcomes  Therapist communicated with CM for recommendations at d/c  Assessment   Assessment Pt engaged in a skilled OT session, consisting of: ADL retraining, functional transfer training, cognitive reorientation, activity tolerance/endurance training, UE ROM exercises, and activity engagement  Co treatment session with Physical Therapy secondary to complex medical condition, requiring two skilled therapists to provide therapeutic techniques to position, facilitate and elicit targeted outcome  Pt received OOB in recliner chair, alert and oriented to person and place, disoriented to time and situation, with no s/s of distress  In comparison to previous session, pt demonstrated improvements in functional ambulation and tranfers with min A of 2, previously mod A of 2  Pt functionally ambulated to bathroom with use of walker and transfer to raised toilet with min A of 2, previously mod of 2 with lateral steps to [facility]  Pt completed UE ROM exercises during session, including AROM for shoulder, elbow, forearm, and wrist  Therapist educated pt and family on UE home exercise program and engagement in daily activities/routine   Pt daughter reported that at home pt has 24/7 supervision, with assistance from family and home health aide  Pt daughter receptive to education during therapy session  Deficits limiting pt occupational performance at this time include: decreased high level cognitive skills, such as decreased short term memory, decreased alertness and orientation to time and situation, decreased activity tolerance, decreased endurance, decreased dynamic sitting and standing balance, and decreased engagement in daily activities  Occupational performance areas limited due to the deficits mentioned above include: bathing, dressing, grooming, toileting, functional mobility, and leisure/social participation  From an OT standpoint, recommendation at d/c would be return home with family support and home OT  Plan   Treatment Interventions ADL retraining;Functional transfer training;UE strengthening/ROM; Endurance training;Patient/family training;Equipment evaluation/education; Compensatory technique education; Activityengagement; Energy conservation   Goal Expiration Date 08/19/20   OT Treatment Day 2   OT Frequency 2-3x/wk   Recommendation   OT Discharge Recommendation Home with skilled therapy  (home OT )   Equipment Recommended Other (comment)  (none )   OT - OK to Discharge Yes

## 2020-08-07 NOTE — PLAN OF CARE
Problem: OCCUPATIONAL THERAPY ADULT  Goal: Performs self-care activities at highest level of function for planned discharge setting  See evaluation for individualized goals  Description: Treatment Interventions: ADL retraining, Functional transfer training, UE strengthening/ROM, Endurance training, Patient/family training, Equipment evaluation/education, Compensatory technique education, Energy conservation, Activityengagement  Equipment Recommended: Other (comment)(none )       See flowsheet documentation for full assessment, interventions and recommendations  Outcome: Progressing  Note: Limitation: Decreased ADL status, Decreased UE strength, Decreased cognition, Decreased endurance, Decreased self-care trans, Decreased high-level ADLs  Prognosis: Good  Assessment: Pt engaged in a skilled OT session, consisting of: ADL retraining, functional transfer training, cognitive reorientation, activity tolerance/endurance training, UE ROM exercises, and activity engagement  Co treatment session with Physical Therapy secondary to complex medical condition, requiring two skilled therapists to provide therapeutic techniques to position, facilitate and elicit targeted outcome  Pt received OOB in recliner chair, alert and oriented to person and place, disoriented to time and situation, with no s/s of distress  In comparison to previous session, pt demonstrated improvements in functional ambulation and tranfers with min A of 2, previously mod A of 2  Pt functionally ambulated to bathroom with use of walker and transfer to raised toilet with min A of 2, previously mod of 2 with lateral steps to [facility]  Pt completed UE ROM exercises during session, including AROM for shoulder, elbow, forearm, and wrist  Therapist educated pt and family on UE home exercise program and engagement in daily activities/routine  Pt daughter reported that at home pt has 24/7 supervision, with assistance from family and home health aide   Pt daughter receptive to education during therapy session  Deficits limiting pt occupational performance at this time include: decreased high level cognitive skills, such as decreased short term memory, decreased alertness and orientation to time and situation, decreased activity tolerance, decreased endurance, decreased dynamic sitting and standing balance, and decreased engagement in daily activities  Occupational performance areas limited due to the deficits mentioned above include: bathing, dressing, grooming, toileting, functional mobility, and leisure/social participation  From an OT standpoint, recommendation at d/c would be return home with family support and home OT  OT Discharge Recommendation: Home with skilled therapy(home OT )  OT - OK to Discharge:  Yes

## 2020-08-08 VITALS
TEMPERATURE: 97.5 F | RESPIRATION RATE: 20 BRPM | BODY MASS INDEX: 39.09 KG/M2 | DIASTOLIC BLOOD PRESSURE: 84 MMHG | WEIGHT: 257.94 LBS | HEART RATE: 60 BPM | SYSTOLIC BLOOD PRESSURE: 154 MMHG | OXYGEN SATURATION: 98 % | HEIGHT: 68 IN

## 2020-08-08 PROBLEM — M11.261 PSEUDOGOUT OF RIGHT KNEE: Status: ACTIVE | Noted: 2020-08-04

## 2020-08-08 PROBLEM — K59.00 CONSTIPATION: Status: ACTIVE | Noted: 2020-08-08

## 2020-08-08 LAB
ALBUMIN SERPL BCP-MCNC: 2.5 G/DL (ref 3.5–5)
ALP SERPL-CCNC: 70 U/L (ref 46–116)
ALT SERPL W P-5'-P-CCNC: 13 U/L (ref 12–78)
ANION GAP SERPL CALCULATED.3IONS-SCNC: 6 MMOL/L (ref 4–13)
AST SERPL W P-5'-P-CCNC: 18 U/L (ref 5–45)
BACTERIA SPEC BFLD CULT: NO GROWTH
BILIRUB SERPL-MCNC: 0.4 MG/DL (ref 0.2–1)
BUN SERPL-MCNC: 24 MG/DL (ref 5–25)
CALCIUM SERPL-MCNC: 8.3 MG/DL (ref 8.3–10.1)
CHLORIDE SERPL-SCNC: 105 MMOL/L (ref 100–108)
CO2 SERPL-SCNC: 30 MMOL/L (ref 21–32)
CREAT SERPL-MCNC: 1.09 MG/DL (ref 0.6–1.3)
ERYTHROCYTE [DISTWIDTH] IN BLOOD BY AUTOMATED COUNT: 12.9 % (ref 11.6–15.1)
GFR SERPL CREATININE-BSD FRML MDRD: 59 ML/MIN/1.73SQ M
GLUCOSE SERPL-MCNC: 95 MG/DL (ref 65–140)
GRAM STN SPEC: NORMAL
GRAM STN SPEC: NORMAL
HCT VFR BLD AUTO: 37.1 % (ref 36.5–49.3)
HGB BLD-MCNC: 11.9 G/DL (ref 12–17)
INR PPP: 2.72 (ref 0.84–1.19)
MCH RBC QN AUTO: 30.9 PG (ref 26.8–34.3)
MCHC RBC AUTO-ENTMCNC: 32.1 G/DL (ref 31.4–37.4)
MCV RBC AUTO: 96 FL (ref 82–98)
PLATELET # BLD AUTO: 154 THOUSANDS/UL (ref 149–390)
PMV BLD AUTO: 10.4 FL (ref 8.9–12.7)
POTASSIUM SERPL-SCNC: 3.9 MMOL/L (ref 3.5–5.3)
PROT SERPL-MCNC: 5.9 G/DL (ref 6.4–8.2)
PROTHROMBIN TIME: 29.1 SECONDS (ref 11.6–14.5)
RBC # BLD AUTO: 3.85 MILLION/UL (ref 3.88–5.62)
SODIUM SERPL-SCNC: 141 MMOL/L (ref 136–145)
WBC # BLD AUTO: 5.91 THOUSAND/UL (ref 4.31–10.16)

## 2020-08-08 PROCEDURE — 80053 COMPREHEN METABOLIC PANEL: CPT | Performed by: INTERNAL MEDICINE

## 2020-08-08 PROCEDURE — 99239 HOSP IP/OBS DSCHRG MGMT >30: CPT | Performed by: INTERNAL MEDICINE

## 2020-08-08 PROCEDURE — 85610 PROTHROMBIN TIME: CPT | Performed by: INTERNAL MEDICINE

## 2020-08-08 PROCEDURE — 85027 COMPLETE CBC AUTOMATED: CPT | Performed by: INTERNAL MEDICINE

## 2020-08-08 RX ORDER — SODIUM PHOSPHATE, DIBASIC AND SODIUM PHOSPHATE, MONOBASIC 7; 19 G/133ML; G/133ML
1 ENEMA RECTAL ONCE
Status: COMPLETED | OUTPATIENT
Start: 2020-08-08 | End: 2020-08-08

## 2020-08-08 RX ORDER — POLYETHYLENE GLYCOL 3350 17 G/17G
17 POWDER, FOR SOLUTION ORAL DAILY PRN
Status: DISCONTINUED | OUTPATIENT
Start: 2020-08-08 | End: 2020-08-08 | Stop reason: HOSPADM

## 2020-08-08 RX ORDER — SENNA AND DOCUSATE SODIUM 50; 8.6 MG/1; MG/1
1 TABLET, FILM COATED ORAL DAILY
Qty: 60 TABLET | Refills: 0 | Status: SHIPPED | OUTPATIENT
Start: 2020-08-08

## 2020-08-08 RX ORDER — COLCHICINE 0.6 MG/1
0.6 TABLET ORAL 2 TIMES DAILY
Qty: 20 TABLET | Refills: 0 | Status: SHIPPED | OUTPATIENT
Start: 2020-08-08

## 2020-08-08 RX ORDER — SENNOSIDES 8.6 MG
1 TABLET ORAL
Status: DISCONTINUED | OUTPATIENT
Start: 2020-08-08 | End: 2020-08-08 | Stop reason: HOSPADM

## 2020-08-08 RX ADMIN — POLYETHYLENE GLYCOL 3350 17 G: 17 POWDER, FOR SOLUTION ORAL at 10:35

## 2020-08-08 RX ADMIN — BUMETANIDE 0.5 MG: 1 TABLET ORAL at 08:34

## 2020-08-08 RX ADMIN — FOLIC ACID TAB 400 MCG 400 MCG: 400 TAB at 08:34

## 2020-08-08 RX ADMIN — SENNOSIDES 8.6 MG: 8.6 TABLET, FILM COATED ORAL at 10:35

## 2020-08-08 RX ADMIN — CARVEDILOL 12.5 MG: 12.5 TABLET, FILM COATED ORAL at 07:34

## 2020-08-08 RX ADMIN — LEVOTHYROXINE SODIUM 25 MCG: 25 TABLET ORAL at 05:59

## 2020-08-08 RX ADMIN — SODIUM PHOSPHATE, DIBASIC AND SODIUM PHOSPHATE, MONOBASIC 1 ENEMA: 7; 19 ENEMA RECTAL at 14:38

## 2020-08-08 RX ADMIN — DOCUSATE SODIUM 100 MG: 100 CAPSULE, LIQUID FILLED ORAL at 08:34

## 2020-08-08 RX ADMIN — SPIRONOLACTONE 12.5 MG: 25 TABLET ORAL at 08:34

## 2020-08-08 RX ADMIN — COLCHICINE 0.6 MG: 0.6 TABLET, FILM COATED ORAL at 08:34

## 2020-08-08 NOTE — PLAN OF CARE
Problem: Prexisting or High Potential for Compromised Skin Integrity  Goal: Skin integrity is maintained or improved  Description: INTERVENTIONS:  - Identify patients at risk for skin breakdown  - Assess and monitor skin integrity  - Assess and monitor nutrition and hydration status  - Monitor labs   - Assess for incontinence   - Turn and reposition patient  - Assist with mobility/ambulation  - Relieve pressure over bony prominences  - Avoid friction and shearing  - Provide appropriate hygiene as needed including keeping skin clean and dry  - Evaluate need for skin moisturizer/barrier cream  - Collaborate with interdisciplinary team   - Patient/family teaching  - Consider wound care consult   Outcome: Progressing     Problem: Potential for Falls  Goal: Patient will remain free of falls  Description: INTERVENTIONS:  - Assess patient frequently for physical needs  -  Identify cognitive and physical deficits and behaviors that affect risk of falls    -  [place] fall precautions as indicated by assessment   - Educate patient/family on patient safety including physical limitations  - Instruct patient to call for assistance with activity based on assessment  - Modify environment to reduce risk of injury  - Consider OT/PT consult to assist with strengthening/mobility  Outcome: Progressing

## 2020-08-08 NOTE — SOCIAL WORK
Per SLIM patient is cleared for discharge today  CM spoke to Salvador Roberts at [address] [facility] who is notified of patient's discharge today  Per nurse patient will need to have a BM prior to discharge today  Patient has family at bedside who will transport patient home today  Treatment team informed

## 2020-08-08 NOTE — ASSESSMENT & PLAN NOTE
Wt Readings from Last 3 Encounters:   08/08/20 117 kg (257 lb 15 oz)   09/15/19 107 kg (235 lb 14 3 oz)   05/24/19 107 kg (235 lb 14 3 oz)     · Chronic diastolic CHF currently compensated    Continue bumex daily

## 2020-08-08 NOTE — DISCHARGE SUMMARY
Discharge- Curtis King 6/21/1929, 80 y o  male MRN: [id number]  Unit/Bed#: -01 Encounter: [id number]  Primary Care Provider: Vidya Mcconnell DO   Date and time admitted to hospital: 8/4/2020  5:54 PM        Admitting Provider:  Donaldo Barton MD  Discharge Provider:  Sarai Doan DO  Admission Date: 8/4/2020       Discharge Date: 08/08/20   LOS: 4  Primary Care Physician at Discharge: Vidya Mcconnell, [address] [place] [address] COURSE:  Curtis King is a 80 y o  male who presented to hospital with right knee swelling and pain  He had difficulty bearing weight  There was some erythema and he was started on cefazolin for question of cellulitis  Eventually he underwent arthrocentesis by orthopedics which culture does not demonstrate any infection  WBC of fluid 23k more likely inflammatory  The patient was started on colchicine for presumptive gout; fluid crystals positive for calcium pyrophosphate  With colchicine the patient's pain continued to subside and he was able to ambulate on day of discharge  He has been constipated likely secondary to immobility and is advised taking stool softener  He will be discharged with VNA/PT    Case was discussed with daughter at time of discharge at bedside  Please see problem list listed below  DISCHARGE DIAGNOSES  * Pseudogout of right knee  Assessment & Plan  · Right knee pain with effusion and question of cellulitis initially was on cefazolin but has been discontinued  · Lower extremity duplex negative for DVT  · 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  · Continue colchicine for crystalline arthropathy and will be discharged with instructions for 10 day course  · Patient will have home PT    Constipation  Assessment & Plan  · Constipation secondary to immobility    · Advised taking docusate/senna    Paroxysmal A-fib [facility])  Assessment & Plan  · Paroxysmal atrial fibrillation anticoagulated on warfarin    Results from last 7 days   Lab Units 08/08/20  0526 08/07/20  0436 08/06/20  0531 08/05/20  0547 08/04/20  1812   INR  2 72* 2 93* 2 79* 2 71* 2 68*       Mixed hyperlipidemia  Assessment & Plan  · Hyperlipidemia continue simvastatin after discharge    Chronic diastolic congestive heart failure [facility])  Assessment & Plan  Wt Readings from Last 3 Encounters:   08/08/20 117 kg (257 lb 15 oz)   09/15/19 107 kg (235 lb 14 3 oz)   05/24/19 107 kg (235 lb 14 3 oz)     · Chronic diastolic CHF currently compensated  Continue bumex daily    Dementia without behavioral disturbance ([address] )  Assessment & Plan  · Dementia without behavioral disturbance  Continue donepezil and quetiapine     Acquired hypothyroidism  Assessment & Plan  · Hypothyroidism continue levothyroxine    Essential hypertension  Assessment & Plan  · Essential hypertension stable on carvedilol    CONSULTING PROVIDERS   IP CONSULT TO ORTHOPEDIC SURGERY    PROCEDURES PERFORMED  * No surgery found *    RADIOLOGY RESULTS  Xr Knee 3 Vw Right Non Injury  Result Date: 8/5/2020  Impression: No acute osseous abnormality  Degenerative changes as described  Small joint effusion   Workstation performed: [id number]       LABS  Results from last 7 days   Lab Units 08/08/20  0526 08/07/20  0436 08/06/20  0531 08/05/20  0547 08/04/20  1812   WBC Thousand/uL 5 91 6 96  --  9 07 8 90   HEMOGLOBIN g/dL 11 9* 11 3*  --  12 2 13 3   HEMATOCRIT % 37 1 35 7*  --  38 4 42 6   MCV fL 96 97  --  98 97   PLATELETS Thousands/uL 154 146*  --  146* 161   INR  2 72* 2 93* 2 79* 2 71* 2 68*     Results from last 7 days   Lab Units 08/08/20  0526 08/05/20  0547 08/04/20  1812   SODIUM mmol/L 141 144 146*   POTASSIUM mmol/L 3 9 4 3 4 0   CHLORIDE mmol/L 105 109* 107   CO2 mmol/L 30 31 34*   BUN mg/dL 24 19 16   CREATININE mg/dL 1 09 1 06 1 09   CALCIUM mg/dL 8 3 8 1* 8 5   ALBUMIN g/dL 2 5*  --  3 2*   TOTAL BILIRUBIN mg/dL 0 40  --  0 50   ALK PHOS U/L 70  --  90   ALT U/L 13  --  23   AST U/L 18  -- 15   EGFR ml/min/1 73sq m 59 61 59   GLUCOSE RANDOM mg/dL 95 93 73             Results from last 7 days   Lab Units 08/04/20  1812   LACTIC ACID mmol/L 1 4   PROCALCITONIN ng/ml <0 05           Cultures:          Results from last 7 days   Lab Units 08/05/20  0931 08/05/20  0930 08/04/20  1821 08/04/20  1812   BLOOD CULTURE   --   --  No Growth at 72 hrs  No Growth at 72 hrs  GRAM STAIN RESULT  4+ Polys  No No bacteria seen 1+ Polys  No organisms seen  --   --    BODY FLUID CULTURE, STERILE   --  No growth  --   --        PHYSICAL EXAM:  Vitals:   Blood Pressure: 154/84 (08/08/20 0714)  Pulse: 60 (08/08/20 0714)  Temperature: 97 5 °F (36 4 °C) (08/07/20 2311)  Temp Source: Oral (08/07/20 0703)  Respirations: 20 (08/08/20 0714)  Height: 5' 8" (172 7 cm) (08/04/20 2031)  Weight - Scale: 117 kg (257 lb 15 oz) (08/08/20 0600)  SpO2: 98 % (08/08/20 0714)    General appearance: alert, appears stated age and cooperative  Normocephalic, without obvious abnormality, atraumatic  Eyes: conjunctivae/corneas clear  PERRL, EOM's intact  Lungs: clear to auscultation bilaterally  Heart: regular rate and rhythm  Abdomen: Soft nontender positive bowel sounds  Back: range of motion normal  Extremities: edema 1+ lower extremities; right knee nontender to range of motion  Neurologic: Grossly normal    Planned Re-admission:  No  Discharge Disposition:  Home with VNA    Test Results Pending at Discharge:   Pending Labs     Order Current Status    Blood culture #1 Preliminary result    Blood culture #2 Preliminary result      Incidental findings:     Medications   · Discharge Medication List: See after visit summary for reconciled discharge medications  Diet restrictions:  Cardiac diet   Activity restrictions: No strenuous activity  Discharge Condition: stable    Outpatient Follow-Up and Discharge Instructions  See after visit summary section titled Discharge Instructions for information provided to patient and family        Code Status: Level 1 - Full Code  Discharge Statement   I spent 35 minutes discharging the patient  This time was spent on the day of discharge  Greater than 50% of total time was spent with the patient and / or family counseling and / or coordination of care  ** Please Note: This note has been constructed using a voice recognition system   **

## 2020-08-08 NOTE — DISCHARGE INSTRUCTIONS
Gout   WHAT YOU NEED TO KNOW:   Gout is a form of arthritis that causes severe joint pain, redness, swelling, and stiffness  Acute gout pain starts suddenly, gets worse quickly, and stops on its own  Acute gout can become chronic and cause permanent damage to the joints  DISCHARGE INSTRUCTIONS:   Seek care immediately if:   · You have severe pain in one or more of your joints that you cannot tolerate  · You have a fever or redness that spreads beyond the joint area  Contact your healthcare provider if:   · You have new symptoms, such as a rash, after you start gout treatment  · Your joint pain and swelling do not go away, even after treatment  · You are not urinating as much or as often as you usually do  · You have trouble taking your gout medicines  · You have questions or concerns about your condition or care  Medicines: You may need any of the following:  · Prescription pain medicine  may be given  Ask your healthcare provider how to take this medicine safely  Some prescription pain medicines contain acetaminophen  Do not take other medicines that contain acetaminophen without talking to your healthcare provider  Too much acetaminophen may cause liver damage  Prescription pain medicine may cause constipation  Ask your healthcare provider how to prevent or treat constipation  · NSAIDs , such as ibuprofen, help decrease swelling, pain, and fever  This medicine is available with or without a doctor's order  NSAIDs can cause stomach bleeding or kidney problems in certain people  If you take blood thinner medicine, always ask your healthcare provider if NSAIDs are safe for you  Always read the medicine label and follow directions  · Gout medicine  decreases joint pain and swelling  It may also be given to prevent new gout attacks  · Steroids  reduce inflammation and can help your joint stiffness and pain during gout attacks      · Uric acid medicine  may be given to reduce the amount of uric acid your body makes  Some medicines may help you pass more uric acid when you urinate  · Take your medicine as directed  Contact your healthcare provider if you think your medicine is not helping or if you have side effects  Tell him or her if you are allergic to any medicine  Keep a list of the medicines, vitamins, and herbs you take  Include the amounts, and when and why you take them  Bring the list or the pill bottles to follow-up visits  Carry your medicine list with you in case of an emergency  Follow up with your healthcare provider as directed:  Write down your questions so you remember to ask them during your visits  Manage gout:   · Rest your painful joint so it can heal   Your healthcare provider may recommend crutches or a walker if the affected joint is in a leg  · Apply ice to your joint  Ice decreases pain and swelling  Use an ice pack, or put crushed ice in a plastic bag  Cover the ice pack or bag with a towel before you apply it to your painful joint  Apply ice for 15 to 20 minutes every hour, or as directed  · Elevate your joint  Elevation helps reduce swelling and pain  Raise your joint above the level of your heart as often as you can  Prop your painful joint on pillows to keep it above your heart comfortably  · Go to physical therapy if directed  A physical therapist can teach you exercises to improve flexibility and range of motion  Prevent gout attacks:   · Do not eat high-purine foods  These foods include meats, seafood, asparagus, spinach, cauliflower, and some types of beans  Healthcare providers may tell you to eat more low-fat milk products, such as yogurt  Milk products may decrease your risk for gout attacks  Vitamin C and coffee may also help  Your healthcare provider or dietitian can help you create a meal plan  · Drink more liquids as directed  Liquids such as water help remove uric acid from your body   Ask how much liquid to drink each day and which liquids are best for you  · Manage your weight  Weight loss may decrease the amount of uric acid in your body  Exercise can help you lose weight  Talk to your healthcare provider about the best exercises for you  · Control your blood sugar level if you have diabetes  Keep your blood sugar level in a normal range  This can help prevent gout attacks  · Limit or do not drink alcohol as directed  Alcohol can trigger a gout attack  Alcohol also increases your risk for dehydration  Ask your healthcare provider if alcohol is safe for you  © 2017 [facility] Information is for End User's use only and may not be sold, redistributed or otherwise used for commercial purposes  All illustrations and images included in CareNotes® are the copyrighted property of [employer]  or [employer]  The above information is an  only  It is not intended as medical advice for individual conditions or treatments  Talk to your doctor, nurse or pharmacist before following any medical regimen to see if it is safe and effective for you

## 2020-08-08 NOTE — ASSESSMENT & PLAN NOTE
· Paroxysmal atrial fibrillation anticoagulated on warfarin    Results from last 7 days   Lab Units 08/08/20  0526 08/07/20  0436 08/06/20  0531 08/05/20  0547 08/04/20  1812   INR  2 72* 2 93* 2 79* 2 71* 2 68*

## 2020-08-10 LAB
BACTERIA BLD CULT: NORMAL
BACTERIA BLD CULT: NORMAL

## 2020-12-13 ENCOUNTER — APPOINTMENT (EMERGENCY)
Dept: CT IMAGING | Facility: HOSPITAL | Age: 85
End: 2020-12-13
Payer: MEDICARE

## 2020-12-13 ENCOUNTER — APPOINTMENT (EMERGENCY)
Dept: RADIOLOGY | Facility: HOSPITAL | Age: 85
End: 2020-12-13
Payer: MEDICARE

## 2020-12-13 ENCOUNTER — HOSPITAL ENCOUNTER (OUTPATIENT)
Facility: HOSPITAL | Age: 85
Setting detail: OBSERVATION
Discharge: HOME HEALTH | End: 2020-12-14
Attending: EMERGENCY MEDICINE | Admitting: EMERGENCY MEDICINE
Payer: MEDICARE

## 2020-12-13 DIAGNOSIS — F03.90 DEMENTIA WITHOUT BEHAVIORAL DISTURBANCE, UNSPECIFIED DEMENTIA TYPE (HCC): ICD-10-CM

## 2020-12-13 DIAGNOSIS — T17.908A ASPIRATION OF FOREIGN BODY, INITIAL ENCOUNTER: Primary | ICD-10-CM

## 2020-12-13 DIAGNOSIS — S32.050A COMPRESSION FRACTURE OF L5 VERTEBRA, INITIAL ENCOUNTER (HCC): ICD-10-CM

## 2020-12-13 DIAGNOSIS — R09.89 CHOKING IN ADULT: ICD-10-CM

## 2020-12-13 DIAGNOSIS — J98.8 AIRWAY OBSTRUCTION: ICD-10-CM

## 2020-12-13 PROBLEM — S32.000A LUMBAR COMPRESSION FRACTURE (HCC): Status: ACTIVE | Noted: 2020-12-13

## 2020-12-13 LAB
ANION GAP SERPL CALCULATED.3IONS-SCNC: 4 MMOL/L (ref 4–13)
APTT PPP: 40 SECONDS (ref 23–37)
BASOPHILS # BLD AUTO: 0.01 THOUSANDS/ΜL (ref 0–0.1)
BASOPHILS NFR BLD AUTO: 0 % (ref 0–1)
BUN SERPL-MCNC: 17 MG/DL (ref 5–25)
CALCIUM SERPL-MCNC: 8.5 MG/DL (ref 8.3–10.1)
CHLORIDE SERPL-SCNC: 107 MMOL/L (ref 100–108)
CO2 SERPL-SCNC: 35 MMOL/L (ref 21–32)
CREAT SERPL-MCNC: 1.12 MG/DL (ref 0.6–1.3)
EOSINOPHIL # BLD AUTO: 0.17 THOUSAND/ΜL (ref 0–0.61)
EOSINOPHIL NFR BLD AUTO: 3 % (ref 0–6)
ERYTHROCYTE [DISTWIDTH] IN BLOOD BY AUTOMATED COUNT: 13.3 % (ref 11.6–15.1)
GFR SERPL CREATININE-BSD FRML MDRD: 57 ML/MIN/1.73SQ M
GLUCOSE SERPL-MCNC: 94 MG/DL (ref 65–140)
HCT VFR BLD AUTO: 40.4 % (ref 36.5–49.3)
HGB BLD-MCNC: 12.8 G/DL (ref 12–17)
IMM GRANULOCYTES # BLD AUTO: 0.02 THOUSAND/UL (ref 0–0.2)
IMM GRANULOCYTES NFR BLD AUTO: 0 % (ref 0–2)
INR PPP: 1.79 (ref 0.84–1.19)
LYMPHOCYTES # BLD AUTO: 1.52 THOUSANDS/ΜL (ref 0.6–4.47)
LYMPHOCYTES NFR BLD AUTO: 23 % (ref 14–44)
MCH RBC QN AUTO: 30.5 PG (ref 26.8–34.3)
MCHC RBC AUTO-ENTMCNC: 31.7 G/DL (ref 31.4–37.4)
MCV RBC AUTO: 96 FL (ref 82–98)
MONOCYTES # BLD AUTO: 0.75 THOUSAND/ΜL (ref 0.17–1.22)
MONOCYTES NFR BLD AUTO: 12 % (ref 4–12)
NEUTROPHILS # BLD AUTO: 4.08 THOUSANDS/ΜL (ref 1.85–7.62)
NEUTS SEG NFR BLD AUTO: 62 % (ref 43–75)
NRBC BLD AUTO-RTO: 0 /100 WBCS
PLATELET # BLD AUTO: 175 THOUSANDS/UL (ref 149–390)
PMV BLD AUTO: 10.8 FL (ref 8.9–12.7)
POTASSIUM SERPL-SCNC: 4.5 MMOL/L (ref 3.5–5.3)
PROTHROMBIN TIME: 21 SECONDS (ref 11.6–14.5)
RBC # BLD AUTO: 4.2 MILLION/UL (ref 3.88–5.62)
SODIUM SERPL-SCNC: 146 MMOL/L (ref 136–145)
TROPONIN I SERPL-MCNC: <0.02 NG/ML
WBC # BLD AUTO: 6.55 THOUSAND/UL (ref 4.31–10.16)

## 2020-12-13 PROCEDURE — 71045 X-RAY EXAM CHEST 1 VIEW: CPT

## 2020-12-13 PROCEDURE — 74177 CT ABD & PELVIS W/CONTRAST: CPT

## 2020-12-13 PROCEDURE — 99285 EMERGENCY DEPT VISIT HI MDM: CPT

## 2020-12-13 PROCEDURE — 85610 PROTHROMBIN TIME: CPT | Performed by: EMERGENCY MEDICINE

## 2020-12-13 PROCEDURE — 71260 CT THORAX DX C+: CPT

## 2020-12-13 PROCEDURE — 99220 PR INITIAL OBSERVATION CARE/DAY 70 MINUTES: CPT | Performed by: INTERNAL MEDICINE

## 2020-12-13 PROCEDURE — 85025 COMPLETE CBC W/AUTO DIFF WBC: CPT | Performed by: EMERGENCY MEDICINE

## 2020-12-13 PROCEDURE — 99285 EMERGENCY DEPT VISIT HI MDM: CPT | Performed by: EMERGENCY MEDICINE

## 2020-12-13 PROCEDURE — 80048 BASIC METABOLIC PNL TOTAL CA: CPT | Performed by: EMERGENCY MEDICINE

## 2020-12-13 PROCEDURE — 84484 ASSAY OF TROPONIN QUANT: CPT | Performed by: EMERGENCY MEDICINE

## 2020-12-13 PROCEDURE — 94664 DEMO&/EVAL PT USE INHALER: CPT

## 2020-12-13 PROCEDURE — 85730 THROMBOPLASTIN TIME PARTIAL: CPT | Performed by: EMERGENCY MEDICINE

## 2020-12-13 PROCEDURE — 93005 ELECTROCARDIOGRAM TRACING: CPT

## 2020-12-13 PROCEDURE — G1004 CDSM NDSC: HCPCS

## 2020-12-13 PROCEDURE — 36415 COLL VENOUS BLD VENIPUNCTURE: CPT | Performed by: EMERGENCY MEDICINE

## 2020-12-13 RX ORDER — ONDANSETRON 2 MG/ML
4 INJECTION INTRAMUSCULAR; INTRAVENOUS EVERY 6 HOURS PRN
Status: DISCONTINUED | OUTPATIENT
Start: 2020-12-13 | End: 2020-12-14 | Stop reason: HOSPADM

## 2020-12-13 RX ORDER — BUMETANIDE 1 MG/1
0.5 TABLET ORAL DAILY
Status: CANCELLED | OUTPATIENT
Start: 2020-12-14

## 2020-12-13 RX ORDER — LEVOTHYROXINE SODIUM 0.03 MG/1
25 TABLET ORAL
Status: CANCELLED | OUTPATIENT
Start: 2020-12-14

## 2020-12-13 RX ORDER — COLCHICINE 0.6 MG/1
0.6 TABLET ORAL
Status: CANCELLED | OUTPATIENT
Start: 2020-12-13

## 2020-12-13 RX ORDER — CARVEDILOL 12.5 MG/1
12.5 TABLET ORAL 3 TIMES DAILY
Status: CANCELLED | OUTPATIENT
Start: 2020-12-13

## 2020-12-13 RX ORDER — HEPARIN SODIUM 5000 [USP'U]/ML
5000 INJECTION, SOLUTION INTRAVENOUS; SUBCUTANEOUS EVERY 8 HOURS SCHEDULED
Status: DISCONTINUED | OUTPATIENT
Start: 2020-12-14 | End: 2020-12-14 | Stop reason: HOSPADM

## 2020-12-13 RX ADMIN — IOHEXOL 100 ML: 350 INJECTION, SOLUTION INTRAVENOUS at 20:52

## 2020-12-14 VITALS
SYSTOLIC BLOOD PRESSURE: 148 MMHG | TEMPERATURE: 97.8 F | OXYGEN SATURATION: 97 % | HEART RATE: 66 BPM | BODY MASS INDEX: 40.4 KG/M2 | DIASTOLIC BLOOD PRESSURE: 78 MMHG | HEIGHT: 67 IN | RESPIRATION RATE: 22 BRPM

## 2020-12-14 LAB
ANION GAP SERPL CALCULATED.3IONS-SCNC: 3 MMOL/L (ref 4–13)
ATRIAL RATE: 61 BPM
BASOPHILS # BLD AUTO: 0.02 THOUSANDS/ΜL (ref 0–0.1)
BASOPHILS NFR BLD AUTO: 0 % (ref 0–1)
BUN SERPL-MCNC: 16 MG/DL (ref 5–25)
CALCIUM SERPL-MCNC: 8.5 MG/DL (ref 8.3–10.1)
CHLORIDE SERPL-SCNC: 106 MMOL/L (ref 100–108)
CO2 SERPL-SCNC: 34 MMOL/L (ref 21–32)
CREAT SERPL-MCNC: 1.13 MG/DL (ref 0.6–1.3)
EOSINOPHIL # BLD AUTO: 0.18 THOUSAND/ΜL (ref 0–0.61)
EOSINOPHIL NFR BLD AUTO: 2 % (ref 0–6)
ERYTHROCYTE [DISTWIDTH] IN BLOOD BY AUTOMATED COUNT: 13.5 % (ref 11.6–15.1)
GFR SERPL CREATININE-BSD FRML MDRD: 57 ML/MIN/1.73SQ M
GLUCOSE P FAST SERPL-MCNC: 90 MG/DL (ref 65–99)
GLUCOSE SERPL-MCNC: 90 MG/DL (ref 65–140)
HCT VFR BLD AUTO: 37.3 % (ref 36.5–49.3)
HGB BLD-MCNC: 12 G/DL (ref 12–17)
IMM GRANULOCYTES # BLD AUTO: 0.02 THOUSAND/UL (ref 0–0.2)
IMM GRANULOCYTES NFR BLD AUTO: 0 % (ref 0–2)
INR PPP: 1.83 (ref 0.84–1.19)
LYMPHOCYTES # BLD AUTO: 2.08 THOUSANDS/ΜL (ref 0.6–4.47)
LYMPHOCYTES NFR BLD AUTO: 28 % (ref 14–44)
MCH RBC QN AUTO: 30.8 PG (ref 26.8–34.3)
MCHC RBC AUTO-ENTMCNC: 32.2 G/DL (ref 31.4–37.4)
MCV RBC AUTO: 96 FL (ref 82–98)
MONOCYTES # BLD AUTO: 0.82 THOUSAND/ΜL (ref 0.17–1.22)
MONOCYTES NFR BLD AUTO: 11 % (ref 4–12)
NEUTROPHILS # BLD AUTO: 4.42 THOUSANDS/ΜL (ref 1.85–7.62)
NEUTS SEG NFR BLD AUTO: 59 % (ref 43–75)
NRBC BLD AUTO-RTO: 0 /100 WBCS
PLATELET # BLD AUTO: 173 THOUSANDS/UL (ref 149–390)
PMV BLD AUTO: 10.7 FL (ref 8.9–12.7)
POTASSIUM SERPL-SCNC: 4.1 MMOL/L (ref 3.5–5.3)
PROTHROMBIN TIME: 21.4 SECONDS (ref 11.6–14.5)
QRS AXIS: 103 DEGREES
QRSD INTERVAL: 128 MS
QT INTERVAL: 438 MS
QTC INTERVAL: 444 MS
RBC # BLD AUTO: 3.89 MILLION/UL (ref 3.88–5.62)
SODIUM SERPL-SCNC: 143 MMOL/L (ref 136–145)
T WAVE AXIS: -42 DEGREES
VENTRICULAR RATE: 62 BPM
WBC # BLD AUTO: 7.54 THOUSAND/UL (ref 4.31–10.16)

## 2020-12-14 PROCEDURE — 36415 COLL VENOUS BLD VENIPUNCTURE: CPT | Performed by: INTERNAL MEDICINE

## 2020-12-14 PROCEDURE — 85610 PROTHROMBIN TIME: CPT | Performed by: INTERNAL MEDICINE

## 2020-12-14 PROCEDURE — 93010 ELECTROCARDIOGRAM REPORT: CPT | Performed by: INTERNAL MEDICINE

## 2020-12-14 PROCEDURE — 80048 BASIC METABOLIC PNL TOTAL CA: CPT | Performed by: INTERNAL MEDICINE

## 2020-12-14 PROCEDURE — 99282 EMERGENCY DEPT VISIT SF MDM: CPT | Performed by: PHYSICIAN ASSISTANT

## 2020-12-14 PROCEDURE — 85025 COMPLETE CBC W/AUTO DIFF WBC: CPT | Performed by: INTERNAL MEDICINE

## 2020-12-14 PROCEDURE — 92610 EVALUATE SWALLOWING FUNCTION: CPT

## 2020-12-14 PROCEDURE — 97163 PT EVAL HIGH COMPLEX 45 MIN: CPT

## 2020-12-14 PROCEDURE — 99217 PR OBSERVATION CARE DISCHARGE MANAGEMENT: CPT | Performed by: INTERNAL MEDICINE

## 2020-12-14 RX ORDER — AMOXICILLIN AND CLAVULANATE POTASSIUM 875; 125 MG/1; MG/1
1 TABLET, FILM COATED ORAL EVERY 12 HOURS SCHEDULED
Qty: 14 TABLET | Refills: 0 | Status: SHIPPED | OUTPATIENT
Start: 2020-12-14 | End: 2020-12-21

## 2020-12-14 RX ADMIN — HEPARIN SODIUM 5000 UNITS: 5000 INJECTION INTRAVENOUS; SUBCUTANEOUS at 01:14

## 2020-12-14 RX ADMIN — HEPARIN SODIUM 5000 UNITS: 5000 INJECTION INTRAVENOUS; SUBCUTANEOUS at 06:34

## 2021-03-30 DIAGNOSIS — Z23 ENCOUNTER FOR IMMUNIZATION: ICD-10-CM

## 2024-01-09 NOTE — PLAN OF CARE
No Problem: Prexisting or High Potential for Compromised Skin Integrity  Goal: Skin integrity is maintained or improved  Description: INTERVENTIONS:  - Identify patients at risk for skin breakdown  - Assess and monitor skin integrity  - Assess and monitor nutrition and hydration status  - Monitor labs   - Assess for incontinence   - Turn and reposition patient  - Assist with mobility/ambulation  - Relieve pressure over bony prominences  - Avoid friction and shearing  - Provide appropriate hygiene as needed including keeping skin clean and dry  - Evaluate need for skin moisturizer/barrier cream  - Collaborate with interdisciplinary team   - Patient/family teaching  - Consider wound care consult   Outcome: Progressing     Problem: Potential for Falls  Goal: Patient will remain free of falls  Description: INTERVENTIONS:  - Assess patient frequently for physical needs  -  Identify cognitive and physical deficits and behaviors that affect risk of falls    -  [place] fall precautions as indicated by assessment   - Educate patient/family on patient safety including physical limitations  - Instruct patient to call for assistance with activity based on assessment  - Modify environment to reduce risk of injury  - Consider OT/PT consult to assist with strengthening/mobility  Outcome: Progressing

## 2024-03-12 NOTE — ED NOTES
Pt direct to CT per dr Jl Baron, RN  03/26/19 0040 [Time Spent: ___ minutes] : I have spent [unfilled] minutes of time on the encounter.